# Patient Record
Sex: FEMALE | Race: WHITE | NOT HISPANIC OR LATINO | Employment: UNEMPLOYED | URBAN - METROPOLITAN AREA
[De-identification: names, ages, dates, MRNs, and addresses within clinical notes are randomized per-mention and may not be internally consistent; named-entity substitution may affect disease eponyms.]

---

## 2018-05-15 ENCOUNTER — TELEPHONE CONVERTED (OUTPATIENT)
Dept: ONCOLOGY | Facility: HOSPITAL | Age: 43
End: 2018-05-15
Attending: NURSE PRACTITIONER

## 2018-09-26 ENCOUNTER — CONVERSION ENCOUNTER (OUTPATIENT)
Dept: GENERAL RADIOLOGY | Facility: HOSPITAL | Age: 43
End: 2018-09-26

## 2018-10-23 ENCOUNTER — CONVERSION ENCOUNTER (OUTPATIENT)
Dept: GENERAL RADIOLOGY | Facility: HOSPITAL | Age: 43
End: 2018-10-23

## 2018-11-06 ENCOUNTER — CONVERSION ENCOUNTER (OUTPATIENT)
Dept: ULTRASOUND IMAGING | Facility: HOSPITAL | Age: 43
End: 2018-11-06

## 2019-10-18 ENCOUNTER — HOSPITAL ENCOUNTER (OUTPATIENT)
Dept: ONCOLOGY | Facility: HOSPITAL | Age: 44
Discharge: HOME | End: 2019-10-18
Attending: INTERNAL MEDICINE

## 2019-10-18 ENCOUNTER — OFFICE VISIT CONVERTED (OUTPATIENT)
Dept: ONCOLOGY | Facility: HOSPITAL | Age: 44
End: 2019-10-18
Attending: INTERNAL MEDICINE

## 2019-10-18 LAB
ALBUMIN SERPL-MCNC: 4.2 G/DL (ref 3.5–5)
ALBUMIN/GLOB SERPL: 1.3 {RATIO} (ref 1.4–2.6)
ALP SERPL-CCNC: 70 U/L (ref 42–98)
ALT SERPL-CCNC: 11 U/L (ref 10–40)
ANION GAP SERPL CALC-SCNC: 15 MMOL/L (ref 8–19)
AST SERPL-CCNC: 15 U/L (ref 15–50)
BASOPHILS # BLD AUTO: 0.05 10*3/UL (ref 0–0.2)
BASOPHILS NFR BLD AUTO: 0.8 % (ref 0–3)
BILIRUB SERPL-MCNC: 0.21 MG/DL (ref 0.2–1.3)
BUN SERPL-MCNC: 16 MG/DL (ref 5–25)
BUN/CREAT SERPL: 33 {RATIO} (ref 6–20)
CALCIUM SERPL-MCNC: 9.1 MG/DL (ref 8.7–10.4)
CHLORIDE SERPL-SCNC: 101 MMOL/L (ref 99–111)
CONV ABS IMM GRAN: 0.02 10*3/UL (ref 0–0.2)
CONV CO2: 26 MMOL/L (ref 22–32)
CONV IMMATURE GRAN: 0.3 % (ref 0–1.8)
CONV TOTAL PROTEIN: 7.4 G/DL (ref 6.3–8.2)
CREAT UR-MCNC: 0.49 MG/DL (ref 0.5–0.9)
DEPRECATED RDW RBC AUTO: 40.4 FL (ref 36.4–46.3)
EOSINOPHIL # BLD AUTO: 0.26 10*3/UL (ref 0–0.7)
EOSINOPHIL # BLD AUTO: 4.1 % (ref 0–7)
ERYTHROCYTE [DISTWIDTH] IN BLOOD BY AUTOMATED COUNT: 13.5 % (ref 11.7–14.4)
FERRITIN SERPL-MCNC: 11 NG/ML (ref 10–200)
GFR SERPLBLD BASED ON 1.73 SQ M-ARVRAT: >60 ML/MIN/{1.73_M2}
GLOBULIN UR ELPH-MCNC: 3.2 G/DL (ref 2–3.5)
GLUCOSE SERPL-MCNC: 89 MG/DL (ref 65–99)
HCT VFR BLD AUTO: 35.4 % (ref 37–47)
HGB BLD-MCNC: 11 G/DL (ref 12–16)
IRON SATN MFR SERPL: 10 % (ref 20–55)
IRON SERPL-MCNC: 44 UG/DL (ref 60–170)
LYMPHOCYTES # BLD AUTO: 1.83 10*3/UL (ref 1–5)
LYMPHOCYTES NFR BLD AUTO: 29.2 % (ref 20–45)
MCH RBC QN AUTO: 25.3 PG (ref 27–31)
MCHC RBC AUTO-ENTMCNC: 31.1 G/DL (ref 33–37)
MCV RBC AUTO: 81.6 FL (ref 81–99)
MONOCYTES # BLD AUTO: 0.58 10*3/UL (ref 0.2–1.2)
MONOCYTES NFR BLD AUTO: 9.3 % (ref 3–10)
NEUTROPHILS # BLD AUTO: 3.53 10*3/UL (ref 2–8)
NEUTROPHILS NFR BLD AUTO: 56.3 % (ref 30–85)
NRBC CBCN: 0 % (ref 0–0.7)
OSMOLALITY SERPL CALC.SUM OF ELEC: 287 MOSM/KG (ref 273–304)
PLATELET # BLD AUTO: 254 10*3/UL (ref 130–400)
PMV BLD AUTO: 10.7 FL (ref 9.4–12.3)
POTASSIUM SERPL-SCNC: 4 MMOL/L (ref 3.5–5.3)
RBC # BLD AUTO: 4.34 10*6/UL (ref 4.2–5.4)
SODIUM SERPL-SCNC: 138 MMOL/L (ref 135–147)
TIBC SERPL-MCNC: 435 UG/DL (ref 245–450)
TRANSFERRIN SERPL-MCNC: 304 MG/DL (ref 250–380)
WBC # BLD AUTO: 6.27 10*3/UL (ref 4.8–10.8)

## 2019-11-01 ENCOUNTER — HOSPITAL ENCOUNTER (OUTPATIENT)
Dept: OTHER | Facility: HOSPITAL | Age: 44
Setting detail: RECURRING SERIES
Discharge: HOME | End: 2019-11-30
Attending: NURSE PRACTITIONER

## 2021-05-28 VITALS
OXYGEN SATURATION: 100 % | HEART RATE: 67 BPM | DIASTOLIC BLOOD PRESSURE: 62 MMHG | SYSTOLIC BLOOD PRESSURE: 104 MMHG | TEMPERATURE: 97.6 F | BODY MASS INDEX: 29.83 KG/M2 | HEIGHT: 65 IN | WEIGHT: 179.01 LBS

## 2021-05-28 VITALS
HEIGHT: 64 IN | BODY MASS INDEX: 28.87 KG/M2 | OXYGEN SATURATION: 100 % | TEMPERATURE: 98.6 F | HEART RATE: 67 BPM | SYSTOLIC BLOOD PRESSURE: 103 MMHG | WEIGHT: 169.09 LBS | DIASTOLIC BLOOD PRESSURE: 66 MMHG

## 2021-05-28 NOTE — PROGRESS NOTES
Patient: MISA LEON     Acct: [id number]     Report: #[id number]  UNIT #: [id number]     : 1975    Encounter Date:10/18/2019  PRIMARY CARE:   ***Signed***  --------------------------------------------------------------------------------------------------------------------  NURSE INTAKE      Visit Type      New Patient Visit            Chief Complaint      ANEMIA            Referring Provider/Copies To      Referring Provider:  PIERCE GUADARRAMA      Primary Care Provider:  PIERCE GUADARRAMA            History and Present Illness      Past History      Patient initially see by Dr. Ishan Lake for iron and B12 deficiency anemia     s/p gastric bypass state.  History from consult is as follows:             This is a 44-year-old female who returns to this clinic for additional treatment    of anemia.  Last visit was in 2015 with Dr. Chun.  Since that time     patient reports feeling well; however, approximately 2 and 1/2 months ago began     to have fatigue, leg cramps, occasional dizziness, and SOA with activity.  She     continues to have menstrual cycles that last 7 days and are heavy in flow.      Patient also had gastric bypass approximately 18 years ago.  No longer taking     Vitamin B12 injections.              -2018.  WBC 4.7, hemoglobin 9.8, platelets 285.  Total iron 34, TIBC     453, Iron saturation 8, Ferritin 5, Vitamin B12 241, Vitamin D 9.  PCP     prescribed Vitamin D 50,000U wkly.      -May 15, 2018. Iron 60, TIBC 512, Iron sat 12%, Ferritin 10. FSH 19.9            Patient received 2 doses of ferraheme in 2018  Last labs are from 2018.            Currently Misa feels extremely fatigued, has pica for the smell of open     markers and heavy detergents.  She has not started to have pica for eating ice     as such.  She denies palpitations, shortness of breath, dizziness, loss of blood    in her stools.            PAST, FAMILY   Past Medical History       Hematology/Oncology (F):  Anemia            Family History      Family History:  Anemia (MOTHER)            Social History      Lives independently:  No            Tobacco Use      Tobacco status:  Former smoker      Quit status:  Quit date established (3 YEARS IN NOVEMBER)            Alcohol Use      Alcohol intake:  None            Substance Use      Substance use:  Denies use            REVIEW OF SYSTEMS      General:  Admits: Fatigue;          Denies: Appetite Change, Fever, Night Sweats, Weight Gain, Weight Loss      Eye:  Denies Blurred Vision, Denies Corrective Lenses, Denies Diplopia, Denies     Vision Changes      ENT:  Denies Headache, Denies Hearing Loss, Denies Hoarseness, Denies Sore Thro    at      Cardiovascular:  Denies Chest Pain, Denies Palpitations      Respiratory:  Denies: Coughing Blood, Productive Cough, Shortness of Air,     Wheezing      Gastrointestinal:  Denies Bloody Stools, Denies Constipation, Denies Diarrhea,     Denies Nausea/Vomiting, Denies Problem Swallowing, Denies Unable to Control     Bowels      Genitourinary:  Denies Blood in Urine, Denies Incontinence, Denies Painful     Urination      Musculoskeletal:  Denies Back Pain, Denies Muscle Pain, Denies Painful Joints      Integumentary:  Denies Itching, Denies Lesions, Denies Rash      Neurologic:  Denies Dizziness, Denies Numbness\Tingling, Denies Seizures      Psychiatric:  Denies Anxiety, Denies Depression      Endocrine:  Denies Cold Intolerance, Denies Heat Intolerance      Hematologic/Lymphatic:  Denies Bruising, Denies Bleeding, Denies Enlarged Lymph     Nodes      Reproductive:  Denies: Menopause, Heavy Periods, Pregnant, Still Menstruating            VITAL SIGNS AND SCORES      Vitals      Height 5 ft 4.33 in / 163.4 cm      Weight 169 lbs 1.485 oz / 76.7 kg      BSA 1.83 m2      BMI 28.7 kg/m2      Temperature 98.6 F / 37 C - Temporal      Pulse 67      Blood Pressure 103/66 Sitting, Right Arm      Pulse Oximetry 100%,  ROOM AIR            Pain Score      Experiencing any pain?:  No      Pain Scale Used:  Numerical      Pain Intensity:  0            Fatigue Score      Experiencing any fatigue?:  Yes      Fatigue (0-10 scale):  6            EXAM      Other      General appearance:  in no apparent distress, cooperative, appears stated age.      HEENT: + pallor, no icterus, oral mucosa moist      Neck: Supple, trachea central-not deviated      Lymph nodes: none palpable peripherally      Cardiovascular: S1-S2 heard, no murmurs, no rubs, no gallops.      Respiratory: Clear to auscultation bilaterally, no adventitious sounds      Abdomen/gastro: Soft, nontender, no palpable hepatosplenomegaly, bowel sounds     heard      Skin: No lesions, no rashes, no petechiae.      Extremities: No pedal edema, peripheral pulses felt, no clubbing            PREVENTION      Hx Influenza Vaccination:  No      Date Influenza Vaccine Given:  Oct 1, 2017      Influenza Vaccine Declined:  No      2 or More Falls Past Year?:  No      Fall Past Year with Injury?:  No      Hx Pneumococcal Vaccination:  No      Encouraged to follow-up with:  PCP regarding preventative exams.      Chart initiated by      JAMAL VEGA [facility]            ALLERGY/MEDS      Allergies      Coded Allergies:             PENICILLINS (Verified  Allergy, Unknown, VOMITING, 10/18/19)           CODEINE (Verified  Adverse Reaction, Unknown, CONFUSION, NAUSEA, 10/18/19)           MORPHINE (Verified  Adverse Reaction, Unknown, CONFUSION, NAUSEA, 10/18/19)      Uncoded Allergies:             Z-PACK (Adverse Reaction, Unknown, DIZZINESS, 5/15/18)            Medications      Last Reconciled on 5/17/18 18:46 by CHAD PIMENTEL MD      Multivitamin (Multivitamins) 1 Each Capsule      1 EACH PO QDAY, CAP         Reported         10/18/19       Escitalopram Oxalate (Escitalopram Oxalate*) 20 Mg Tablet      20 MG PO QDAY, TAB         Reported         10/18/19       Escitalopram Oxalate (Escitalopram  Oxalate*) 20 Mg Tablet      20 MG PO QDAY, TAB         Reported         5/15/18      Medications Reviewed:  Changes made to meds            IMPRESSION/PLAN      Diagnosis      Iron deficiency anemia         Other iron deficiency anemia - D50.8         Iron deficiency anemia type: other iron deficiency      Iron deficiency anemia secondary to status post gastric bypass state.  Last time    patient was treated with Feraheme was in 5/2018.      Obtain iron profile, CBC with differential, CMP, ferritin and we will call the     patient in the next few days to go over the results and schedule a Feraheme.      Patient will need to be compliant with coming back to the clinic every 6 months     with laboratory investigations drawn and ready for review prior to next     appointment.            Pernicious anemia - D51.0      As long as the patient has B12 supplies she is compliant with self injections-    she needs vitamin B12 1000 mcg subcutaneously once every month.  Prescription     given for the same.      New Medications      * Cyanocobalamin (Vitamin B-12) Inj (Cyanocobalamin Inj) 1,000 MCG/1 ML VIAL:       1,000 MCG SUBQ MO #3      New Diagnostics      * CBC, Routine      * CMP Comp Metabolic Panel, Routine      * Iron Profile, Routine      * Ferritin Level, Routine      * CBC, 6 Months      * CMP Comp Metabolic Panel, 6 Months      * Iron Profile, 6 Months      * Ferritin Level, 6 Months            Notes      Patient is to return with labs in 6 months and we will call her in the interim     to arrange for Feraheme.      New Medications      * ESCITALOPRAM OXALATE (Escitalopram Oxalate*) 20 MG TABLET: 20 MG PO QDAY      * Multivitamin (Multivitamins) 1 EACH CAPSULE: 1 EACH PO QDAY      Discontinued Medications      * Cyanocobalamin (Vitamin B-12) Inj (Cyanocobalamin Inj) 1,000 MCG/1 ML VIAL:       1,000 MCG SUBQ Mo 30 Days #12         Instructions: 1000 mcg or 1 ml, SubQ daily x 5 days, then weekly x 4 weeks,         then monthly x 3 months      * Syringe Insulin w Needle 1 ml (Insulin Syringe with Needle, 1 ml) 1 EACH       DISP.SYRIN: SYRINGE XX QDAY #12         Instructions: subcutaneous needle.                 Disclaimer: Converted document may not contain table formatting or lab diagrams. Please see [employer] [facility] for the authenticated document.

## 2021-05-28 NOTE — TELEPHONE ENCOUNTER
Patient: MISA LEON     Acct: [id number]     Report: #[id number]  UNIT #: [id number]     : 1975    Encounter Date:05/15/2018  PRIMARY CARE:   ***Signed***  --------------------------------------------------------------------------------------------------------------------  MD      Provider:  Dr. Lake            Allergies      Coded Allergies:             PENICILLINS (Verified  Allergy, Unknown, VOMITING, 5/15/18)           CODEINE (Verified  Adverse Reaction, Unknown, CONFUSION, NAUSEA, 5/15/18)           MORPHINE (Verified  Adverse Reaction, Unknown, CONFUSION, NAUSEA, 5/15/18)      Uncoded Allergies:             Z-PACK (Adverse Reaction, Unknown, DIZZINESS, 5/15/18)            Medications      Last Reconciled on 5/15/18 17:57 by WILLIAM PRICE      Syringe Insulin w Needle 1 ml (Insulin Syringe with Needle, 1 ml) 1 Each     Disp.syrin      SYRINGE XX QDAY, #12 0 Refills         Prov: WILLIAM PRICE         5/15/18       Cyanocobalamin (Vitamin B-12*) 1,000 Mcg/1 Ml Vial      1000 MCG SUBQ Mo for 30 Days, #12 VIAL 0 Refills         Prov: WILLIAM PRICE         5/15/18       Escitalopram Oxalate (Escitalopram Oxalate*) 20 Mg Tablet      20 MG PO QDAY, TAB         Reported         5/15/18      Current Medications      Current Medications Reviewed 18            Time of Call      12:23      Source of Call:  Patient            Reason for Call            Pt called to report that syringes and needles for her vit B12 were not      called into [facility]. Called in to [facility] per APRN's order. Pt notified. No      further questions. JS            Guideline(s) Referenced      Yes            Verbalized Understanding      Caller verbalized, understanding of instructions given            Electronically signed by WILLIAM PRICE  2020 14:02  Electronically signed by ADMINISTRATION,EMILY  2020 14:17       Disclaimer: Converted document may not contain table formatting  or lab diagrams. Please see [employer] [facility] for the authenticated document.

## 2021-05-28 NOTE — PROGRESS NOTES
Patient: MISA LEON     Acct: [id number]     Report: #[id number]  UNIT #: [id number]     : 1975    Encounter Date:05/15/2018  PRIMARY CARE:   ***Signed***  --------------------------------------------------------------------------------------------------------------------  Chief Complaint      May 15, 2018      ANEMIA            Current Plan      -Anemia      -History of gastric bypass surgery .       -Vitamin B12 1000mcg SQ daily x5 then weekly x4 then monthly x3      -CBC, iron profile, ferritin, LDH   -FSH to determine if patient has begun menopause. Patient is not in menopause.     FSH is 18.9.       -Urinalysis to check for infection and bleeding, UA was normal.       -Will schedule for iron infusions once labs return      -Fereheme ordered x 2.       -RTC in 3 months. Recheck iron panel, ferritin, B-12, folate in 3 months.              -Vitamin B12 deficiency      -Continue vitamin B12 injections.      -Recheck levels today            -Mood disorder      -Continue Lexapro      -Stable            -Today's Evaluation      -Patient's imaging exams, blood tests, physicians' notes, and any new findings     since our last visit were reviewed today to reassess patient's medical     treatment plan.      -Old medical records were reviewed and summarized in chronological order in the     HPI today to maintain an updated medical record.       -Patient's radiology imaging tests from our last visit were reviewed     independently by me by direct visualization of the images.        -Patients current lab tests and medications were carefully reviewed to evaluate     patient's current treatment plan today.       -Patient was advised to call us right away if there are any new symptoms for an     urgent visit for further evaluation. Patient voiced understanding and agreed to     do so.      Pernicious anemia - D51.0            Notes      New Medications      * ESCITALOPRAM OXALATE (Escitalopram  Oxalate*) 20 MG TABLET: 20 MG PO QDAY      * Cyanocobalamin (Vitamin B-12*) 1,000 MCG/1 ML VIAL: 1,000 MCG SUBQ Mo 30 Days     #12       Instructions: 1000 mcg or 1 ml, SubQ daily x 5 days, then weekly x 4 weeks,     then monthly x 3 months      * Syringe Insulin w Needle 1 ml (Insulin Syringe with Needle, 1 ml) 1 EACH     DISP.SYRIN: SYRINGE XX QDAY #12       Instructions: subcutaneous needle.       New Diagnostics      * Haptoglobin, Stat       Dx: Pernicious anemia - D51.0      * LDH       Dx: Pernicious anemia - D51.0      * CBC       Dx: Pernicious anemia - D51.0      * Fsh/Folicle Stimulat, Stat       Dx: Pernicious anemia - D51.0      * Urinalyis W/Micro, Stat       Dx: Pernicious anemia - D51.0      * Iron Profile       Dx: Pernicious anemia - D51.0      * Ferritin Level, As Soon As Possible       Dx: Pernicious anemia - D51.0      Intensive Monitor for Toxicity:  Labs Reviewed, Meds\Narcotics Reviewed      Labs Reviewed During Visit?:  Yes      Time Spent:  > 50% /Coord Care      Patient Education Provided:  Yes      ECOG Score:  0            ECOG      ECOG Performance Status:  0            History and Present Illness      This is a 43-year-old female who returns to this clinic for additional     treatment of anemia.  Last visit was in July 2015 with Dr. Chun.  Since that     time patient reports feeling well; however, approximately 2 and 1/2 months ago     began to have fatigue, leg cramps, occasional dizziness, and SOA with activity.      She continues to have menstrual cycles that last 7 days and are heavy in     flow.  Patient also had gastric bypass approximately 18 years ago.  No longer     taking Vitamin B12 injections.              -March 20, 2018.  WBC 4.7, hemoglobin 9.8, platelets 285.  Total iron 34, TIBC     453, Iron saturation 8, Ferritin 5, Vitamin B12 241, Vitamin D 9.  PCP     prescribed Vitamin D 50,000U wkly.      -May 15, 2018. Iron 60, TIBC 512, Iron sat 12%, Ferritin 10. FSH  19.9            Vitals      Height 5 ft 4.76 in / 164.5 cm      Weight 179 lbs 0.216 oz / 81.2 kg      BSA 1.95 m2      BMI 30.0 kg/m2      Temperature 97.6 F / 36.44 C - Temporal      Pulse 67      Blood Pressure 104/62 Sitting, Right Arm      Pulse Oximetry 100%, ROOM AIR            Allergies      Coded Allergies:             PENICILLINS (Verified  Allergy, Unknown, VOMITING, 5/15/18)           CODEINE (Verified  Adverse Reaction, Unknown, CONFUSION, NAUSEA, 5/15/18)           MORPHINE (Verified  Adverse Reaction, Unknown, CONFUSION, NAUSEA, 5/15/18)      Uncoded Allergies:             Z-PACK (Adverse Reaction, DIZZINESS, 5/15/18)            Medications      Last Reconciled on 5/17/18 18:46 by CHAD PIMENTEL MD      Syringe Insulin w Needle 1 ml (Insulin Syringe with Needle, 1 ml) 1 Each     Disp.syrin      SYRINGE XX QDAY, #12 0 Refills         Prov: WILLIAM PRICE onc         5/15/18       Cyanocobalamin (Vitamin B-12*) 1,000 Mcg/1 Ml Vial      1000 MCG SUBQ Mo for 30 Days, #12 VIAL 0 Refills         Prov: WILLIAM PRICE onc         5/15/18       Escitalopram Oxalate (Escitalopram Oxalate*) 20 Mg Tablet      20 MG PO QDAY, TAB         Reported         5/15/18            General Information      Referring Provider:  PIERCE GUADARRAMA      Primary Provider:  PIERCE GUADARRAMA            Pain and Fatigue Scales      Pain Assessment:           Experiencing any pain?:  No      Fatigue:           Experiencing any fatigue?:  Yes         Fatigue (0-10 scale):  3            Chemo Status      On Oral Chemotherapy?:  No            Other      Clinical Trial Participant?:  No            PAST, FAMILY   Past Medical History      Past Medical History:  No Diabetes Type 1, No Diabetes Type 2, No Thyroid     Disease, No COPD, No Emphysema, No Hypertension, No Stroke, No High Cholesterol    , No Heart Attack, No Bleeding Condition, No Low or High RBC Count, No Low or     High WBC Count, No Low or High Platelet Coun, No Hepatitis, No  Kidney Disease,     No Depression, No Alzheimer's Disease, No Mental Disease, No Seizures, No     Arthritis, No Osteoporosis, No Osteopenia, No Short of Air, No Sleep apnea, No     Liver Disease, No STD, No Enlarged Prostate, No Other      Hematology/oncology:  REPORTS HX OF: Anemia, DENIES HX OF: Previous Treatment     for CA, Bladder Cancer, Blood cancer, Brain cancer, Breast cancer, Cervical     cancer, Coagulopathy, Colorectal cancer, Endocrine cancer, Eye cancer, GI cancer    ,  cancer, Kidney cancer, Leukemia, Leukocytosis, Leukopenia, Liver cancer,     Lung cancer, Lymphoma, Musculoskeletal cancer, Myeloma, Neurologic cancer, Oral     cancer, Ovarian cancer, Skin cancer, Stomach cancer, Thrombocytopenia, Thyroid     cancer, Uterine cancer, Other cancer history, Other hematologic history      Genetic/metabolic:  DENIES HX OF: Cystic fibrosis, Down syndrome, Other genetic     history, Other metabolic history            Past Surgical History      REPORTS HX OF: Cholecystectomy, Other Past Surgical Hx (GASTRIC BYPASS), DENIES     HX OF: Cataract extraction, Thyroid surgery, Lung biopsy, CABG surgery,     Coronary stent, Valve replacement, Appendectomy, Splenectomy, Bladder surgery,     Nephrectomy, Joint replacement, Frature repair, Skin cancer removal, Melanoma     excision, Spinal surgery, Breast biopsy, Lumpectomy, Mastectomy, bilateral,     Mastectomy, right, Mastectomy, left, Hysterectomy, Peg Tube Placement, VAD     Placement, Biopsy            Family History      REPORTS HX OF: Liver Cancer (FATHER), DENIES HX OF: Anemia, Blood disorders,     Blood Cancer, Breast cancer, Cervical cancer, Coagulopathy, Colorectal cancer,     Endocrine Cancer, Eye Cancer, GI Cancer,  Cancer, Kidney Cancer, Leukemia,     Leukocytosis, Leukopenia, Lung cancer, Lymphoma, Melanoma, Musculoskeletal     Cancer, Myeloma, Neurologic Cancer, Oral Cancer, Ovarian cancer, Prostate cancer    , Skin Cancer, Stomach Cancer, Testicular  Cancer, Thrombocytopenia, Thyroid     cancer, Uterine cancer, Other Cancer History, Other Hematology History            Social History      Lives independently:  No            Tobacco Use      Tobacco status:  Former smoker      Quit status:  Quit date established (3 YEARS IN NOVEMBER)            Alcohol Use      Alcohol intake:  None            Substance Use      Substance use:  Denies use            REVIEW OF SYSTEMS      General:  Complains of: Fatigue, Denies: Appetite change, Excessive sweating,     Fever, Night sweats, Weight gain, Weight loss, Other      Eyes:  Denies: Blurred vision, Corrective lenses, Diplopia, Eye irritation, Eye     pain, Eye redness, Spots in vision, Vision loss, Other      Ears, nose, mouth, throat:  Denies: Headache, Seizures, Visual Changes, Hearing     loss, Sinus Congestion, Hoarseness, Sore throat, Other      Cardiovascular:  Denies: Chest pain, Irregular heartbeat, Palpitations, Swollen     ankles/legs, Other      Respiratory:  Denies: Chest pain, Shortness of Air, Productive cough, Coughing     blood, Other      Gastrointestinal:  Denies: Nausea, Vomiting, Problem swallowing, Frequent     heartburn, Constipation, Diarrhea, Tarry stools, Bloody stools, Unable to     control bowels, Other      Kidney/Bladder:  Denies: Painful Urination, Change in urinary stream, Blood in     urine, Incontinence, Frequent Urination, Decreased urine stream, Other      Musculoskeletal:  Denies: New Back pain, Leg Cramps, Painful Joints, Swollen     Joints, Muscle Pain, Muscle weakness, Other      Skin:  DENIES: Jaundice, Easy Bleeding, Lesions/changes in moles, Nail changes,     Skin Discoloration, Rash, Other      Neurological:  Denies: Dizziness, Fainting, Numbness\Tingling, Paralysis,     Seizures, Other      Psychiatric:  Complains of: AAO X 3, Denies: Anxiety, Panic attacks, Depression    , Memory loss, Other      Endocrine:  DiabetesThyroid DisorderOsteoporosisEndocrine Other       Hematologic/lymphatic:  Denies: Bruising, Bleeding, Enlarged Lymph Nodes,     Recurrent infections, Other      Reproductive:  Complains of Still Menstruating, Complains of Heavy Periods,     Denies Pregnant, Denies Menopause, Denies Other            General Appearance:  Alert, Oriented X3, Cooperative, No acute distress      Eyes:  Anicteric Sclerae, Moist Conjunctiva      HEENT:  Orophraynx clear, No Erythema, No Pallor, No Thrush      Neck:  Supple, Full ROM      Respiratory:  CTAB, No Rales, No Crackels, No Stridor, No Rhonchi      Breast\Chest:  Symmetrical      Abdomen\Gastro:  Soft      Cardio:  RRR, No Murmur, No, Peripheral Edema      Skin:  Normal Temperature, Normal Tone, Normal Texture and Turgor, No Rash,     lesions, ulcers      Psychiatric:  Appropriate Affect, Intact Judgement, AAO x3      Neuro:  Cranial Nerve II-XII Inta, No Focal Sensory Deficit      Genitourinary:  No Saba Catheter      Muscularskeletal:  Normal Gait and Station, Full ROM of extremeties, Full     muscle strength\tone      Extremities:  No Digital Cyanosis, Normal Gait and station, No Weakness      Lymphatic:  No Cervical, No Supraclavicular, No Infraclavicular, No Axillary            PREVENTION      Hx Influenza Vaccination:  Yes      Date Influenza Vaccine Given:  Oct 1, 2017      Influenza Vaccine Declined:  No      2 or More Falls Past Year?:  No      Fall Past Year with Injury?:  No      Hx Pneumococcal Vaccination:  Yes      Encouraged to follow-up with:  PCP regarding preventative exams.      Chart initiated by      JAMAL VEGA CMA            Patient was seen in clinic, physical exam was performed, lab and imaging tests     were noted, and above medical documentation was also done by me.  Thank you     very much for the opportunity to participate in your patient's care.  Warm     Regards.             Ishan Lake MD FACP      Board Certified Hematologist      Board Certified Medical Oncologist             Electronically signed by WILLIAM PRICE  05/15/2018 17:57  Signed by SUSI ESQUIVEL  02/26/2020 09:06  Electronically signed by CAHD PIMENTEL  05/17/2018 18:46       Disclaimer: Converted document may not contain table formatting or lab diagrams. Please see [employer] for the authenticated document.

## 2022-10-11 ENCOUNTER — OFFICE VISIT (OUTPATIENT)
Dept: INTERNAL MEDICINE | Facility: CLINIC | Age: 47
End: 2022-10-11

## 2022-10-11 VITALS
HEIGHT: 64 IN | TEMPERATURE: 98.2 F | WEIGHT: 151.38 LBS | HEART RATE: 73 BPM | SYSTOLIC BLOOD PRESSURE: 122 MMHG | OXYGEN SATURATION: 99 % | BODY MASS INDEX: 25.84 KG/M2 | DIASTOLIC BLOOD PRESSURE: 76 MMHG

## 2022-10-11 DIAGNOSIS — E61.1 IRON DEFICIENCY: ICD-10-CM

## 2022-10-11 DIAGNOSIS — Z12.31 VISIT FOR SCREENING MAMMOGRAM: ICD-10-CM

## 2022-10-11 DIAGNOSIS — H93.8X1 SENSATION OF FULLNESS IN RIGHT EAR: ICD-10-CM

## 2022-10-11 DIAGNOSIS — R10.9 FLANK PAIN: ICD-10-CM

## 2022-10-11 DIAGNOSIS — Z87.891 STOPPED SMOKING WITH GREATER THAN 30 PACK YEAR HISTORY: ICD-10-CM

## 2022-10-11 DIAGNOSIS — R00.2 PALPITATIONS: ICD-10-CM

## 2022-10-11 DIAGNOSIS — Z12.11 SCREENING FOR COLON CANCER: ICD-10-CM

## 2022-10-11 DIAGNOSIS — Z72.89 CURRENT EVERY DAY VAPING: ICD-10-CM

## 2022-10-11 DIAGNOSIS — F41.9 ANXIETY: ICD-10-CM

## 2022-10-11 DIAGNOSIS — F33.0 MAJOR DEPRESSIVE DISORDER, RECURRENT, MILD: ICD-10-CM

## 2022-10-11 DIAGNOSIS — Z76.89 ENCOUNTER TO ESTABLISH CARE: Primary | ICD-10-CM

## 2022-10-11 LAB
ALBUMIN SERPL-MCNC: 4.3 G/DL (ref 3.5–5.2)
ALBUMIN/GLOB SERPL: 1.5 G/DL
ALP SERPL-CCNC: 94 U/L (ref 39–117)
ALT SERPL W P-5'-P-CCNC: 15 U/L (ref 1–33)
ANION GAP SERPL CALCULATED.3IONS-SCNC: 8.9 MMOL/L (ref 5–15)
AST SERPL-CCNC: 22 U/L (ref 1–32)
BASOPHILS # BLD AUTO: 0.02 10*3/MM3 (ref 0–0.2)
BASOPHILS NFR BLD AUTO: 0.5 % (ref 0–1.5)
BILIRUB SERPL-MCNC: 0.5 MG/DL (ref 0–1.2)
BUN SERPL-MCNC: 13 MG/DL (ref 6–20)
BUN/CREAT SERPL: 22.4 (ref 7–25)
CALCIUM SPEC-SCNC: 9.6 MG/DL (ref 8.6–10.5)
CHLORIDE SERPL-SCNC: 100 MMOL/L (ref 98–107)
CHOLEST SERPL-MCNC: 166 MG/DL (ref 0–200)
CO2 SERPL-SCNC: 29.1 MMOL/L (ref 22–29)
CREAT SERPL-MCNC: 0.58 MG/DL (ref 0.57–1)
DEPRECATED RDW RBC AUTO: 34.4 FL (ref 37–54)
EGFRCR SERPLBLD CKD-EPI 2021: 111.1 ML/MIN/1.73
EOSINOPHIL # BLD AUTO: 0.17 10*3/MM3 (ref 0–0.4)
EOSINOPHIL NFR BLD AUTO: 4.1 % (ref 0.3–6.2)
ERYTHROCYTE [DISTWIDTH] IN BLOOD BY AUTOMATED COUNT: 12.3 % (ref 12.3–15.4)
FERRITIN SERPL-MCNC: 48.3 NG/ML (ref 13–150)
GLOBULIN UR ELPH-MCNC: 2.8 GM/DL
GLUCOSE SERPL-MCNC: 88 MG/DL (ref 65–99)
HCT VFR BLD AUTO: 38.9 % (ref 34–46.6)
HDLC SERPL-MCNC: 67 MG/DL (ref 40–60)
HGB BLD-MCNC: 12.7 G/DL (ref 12–15.9)
IMM GRANULOCYTES # BLD AUTO: 0 10*3/MM3 (ref 0–0.05)
IMM GRANULOCYTES NFR BLD AUTO: 0 % (ref 0–0.5)
IRON 24H UR-MRATE: 109 MCG/DL (ref 37–145)
IRON SATN MFR SERPL: 24 % (ref 20–50)
LDLC SERPL CALC-MCNC: 86 MG/DL (ref 0–100)
LDLC/HDLC SERPL: 1.28 {RATIO}
LYMPHOCYTES # BLD AUTO: 1.52 10*3/MM3 (ref 0.7–3.1)
LYMPHOCYTES NFR BLD AUTO: 36.5 % (ref 19.6–45.3)
MCH RBC QN AUTO: 25.8 PG (ref 26.6–33)
MCHC RBC AUTO-ENTMCNC: 32.6 G/DL (ref 31.5–35.7)
MCV RBC AUTO: 79.1 FL (ref 79–97)
MONOCYTES # BLD AUTO: 0.43 10*3/MM3 (ref 0.1–0.9)
MONOCYTES NFR BLD AUTO: 10.3 % (ref 5–12)
NEUTROPHILS NFR BLD AUTO: 2.02 10*3/MM3 (ref 1.7–7)
NEUTROPHILS NFR BLD AUTO: 48.6 % (ref 42.7–76)
NRBC BLD AUTO-RTO: 0 /100 WBC (ref 0–0.2)
PLATELET # BLD AUTO: 258 10*3/MM3 (ref 140–450)
PMV BLD AUTO: 11.2 FL (ref 6–12)
POTASSIUM SERPL-SCNC: 4.1 MMOL/L (ref 3.5–5.2)
PROT SERPL-MCNC: 7.1 G/DL (ref 6–8.5)
RBC # BLD AUTO: 4.92 10*6/MM3 (ref 3.77–5.28)
SODIUM SERPL-SCNC: 138 MMOL/L (ref 136–145)
T4 FREE SERPL-MCNC: 1.14 NG/DL (ref 0.93–1.7)
TIBC SERPL-MCNC: 446 MCG/DL (ref 298–536)
TRANSFERRIN SERPL-MCNC: 299 MG/DL (ref 200–360)
TRIGL SERPL-MCNC: 66 MG/DL (ref 0–150)
TSH SERPL DL<=0.05 MIU/L-ACNC: 0.43 UIU/ML (ref 0.27–4.2)
VLDLC SERPL-MCNC: 13 MG/DL (ref 5–40)
WBC NRBC COR # BLD: 4.16 10*3/MM3 (ref 3.4–10.8)

## 2022-10-11 PROCEDURE — 99205 OFFICE O/P NEW HI 60 MIN: CPT | Performed by: NURSE PRACTITIONER

## 2022-10-11 PROCEDURE — 83540 ASSAY OF IRON: CPT | Performed by: NURSE PRACTITIONER

## 2022-10-11 PROCEDURE — 80050 GENERAL HEALTH PANEL: CPT | Performed by: NURSE PRACTITIONER

## 2022-10-11 PROCEDURE — 84439 ASSAY OF FREE THYROXINE: CPT | Performed by: NURSE PRACTITIONER

## 2022-10-11 PROCEDURE — 81003 URINALYSIS AUTO W/O SCOPE: CPT | Performed by: NURSE PRACTITIONER

## 2022-10-11 PROCEDURE — 80061 LIPID PANEL: CPT | Performed by: NURSE PRACTITIONER

## 2022-10-11 PROCEDURE — 84466 ASSAY OF TRANSFERRIN: CPT | Performed by: NURSE PRACTITIONER

## 2022-10-11 PROCEDURE — 82728 ASSAY OF FERRITIN: CPT | Performed by: NURSE PRACTITIONER

## 2022-10-11 RX ORDER — FLUTICASONE PROPIONATE 50 MCG
2 SPRAY, SUSPENSION (ML) NASAL DAILY
Qty: 16 G | Refills: 0 | Status: SHIPPED | OUTPATIENT
Start: 2022-10-11

## 2022-10-11 RX ORDER — BUPROPION HYDROCHLORIDE 150 MG/1
TABLET, EXTENDED RELEASE ORAL
Qty: 60 TABLET | Refills: 1 | Status: SHIPPED | OUTPATIENT
Start: 2022-10-11

## 2022-10-11 NOTE — ASSESSMENT & PLAN NOTE
Depression well-controlled, continue Lexapro.  Patient continues with flares of anxiety, does not tolerate BuSpar.  Discussed Wellbutrin, patient would like to proceed with medication.  Denies history of seizures.  Discussed potential for lightheadedness, headache, abnormal dreams.  She is aware of increased risk of suicidal thoughts with this medication and will discontinue immediately with concerns.  Follow-up in 1 month to evaluate medication effectiveness, sooner if concerns arise patient denies SI/HI.

## 2022-10-11 NOTE — PROGRESS NOTES
Chief Complaint  Establish Care and Earache (Right ear sounds like ocean in ear )    Subjective         Rosanna Mandujano presents to [facility]  Previous PCP: Latia Isidro   Last labs: 2019  LMP: Nine months ago  PAP: 2018, due  Mammogram: 2018, due; Denies family history of breast cancer  Colonoscopy: Denies family history of colon cancer  Influenza vaccination: Declines  COVID19 vaccination: Up to date  Shingles vaccination: Will consider  Eye exam: 2021  Dental exam: Dentures  Smoking history: Quit smoking 2017, smoked 3 PPD x 20 years, now vaping  Chest CT scan: Due at age 48  Specialists: None     Iron deficiency-  History of treatment with infusions, last one a year ago. Has not followed up with hematology recently, states she can usually tell when she feels low.  Patient with history of gastric bypass in 2005, has lost close to 250 pounds.    Depression/anxiety-  Managed with Lexapro, depression well controlled with medication but anxiety is not. Has previously tried Prozac, Zoloft, Paxil (had suicidal ideations). Has Buspar but will not take because this makes her feel off.  Patient states she does not like to take medications that make her feel funny and weird.  She would be interested in an alternative to help with anxiety but is happy with Lexapro for depression.  Patient admits to high anxiety, often stresses about her health and postpones procedures due to fear that something is wrong.  Denies SI/HI.     Palpitations-  Patient reports palpitations, often a few times a week.  Will occur with and without anxiety.  Sometimes she will feel lightheaded when these occur.  Has had episodes where they wake her up at night.  Denies chest pain, shortness of breath, diaphoresis.    Patient reports she had a cold approximately two weeks ago, has since felt like she has an ocean in her right ear. Denies pain, discharge. Has not been able to hear as well.  "Went to Urgent Care initially, was treated with a nasal spray and cefdinir.     Patient reports right sided back, flank pain, axillary/right upper chest x a few weeks.  Sometimes worse with breathing intermittent, daily. Describes as a dull pain, sometimes sharp. Typically lasts a few minutes. Denies shortness of breath, cough, wheezing, dysuria, hematuria. Has never experienced this before. Denies injury or frequent exercise.           Objective     Vitals:    10/11/22 0810   BP: 122/76   BP Location: Right arm   Patient Position: Sitting   Cuff Size: Adult   Pulse: 73   Temp: 98.2 °F (36.8 °C)   TempSrc: Infrared   SpO2: 99%   Weight: 68.7 kg (151 lb 6 oz)   Height: 163.4 cm (64.33\")      Body mass index is 25.72 kg/m².    Wt Readings from Last 3 Encounters:   10/11/22 68.7 kg (151 lb 6 oz)   09/23/22 68.9 kg (152 lb)   10/18/19 76.7 kg (169 lb 1.5 oz)     BP Readings from Last 3 Encounters:   10/11/22 122/76   09/23/22 109/63   06/26/22 125/72                Physical Exam  Constitutional:       Appearance: Normal appearance.   HENT:      Head: Normocephalic and atraumatic.      Ears:      Comments: Bilateral TM nonpurulent effusion     Nose: Nose normal.      Mouth/Throat:      Mouth: Mucous membranes are moist.      Pharynx: Oropharynx is clear.   Eyes:      Extraocular Movements: Extraocular movements intact.      Conjunctiva/sclera: Conjunctivae normal.      Pupils: Pupils are equal, round, and reactive to light.   Neck:      Thyroid: No thyroid mass, thyromegaly or thyroid tenderness.   Cardiovascular:      Rate and Rhythm: Normal rate and regular rhythm.      Heart sounds: Normal heart sounds.   Pulmonary:      Effort: Pulmonary effort is normal.      Breath sounds: Normal breath sounds.   Abdominal:      General: Bowel sounds are normal.      Palpations: Abdomen is soft.   Skin:     General: Skin is warm and dry.   Neurological:      General: No focal deficit present.      Mental Status: She is alert and " oriented to person, place, and time.   Psychiatric:         Mood and Affect: Mood normal.         Behavior: Behavior normal.         Thought Content: Thought content normal.          Result Review :   The following data was reviewed by: YAZAN Taylor on 10/11/2022:      Procedures    Assessment and Plan   Diagnoses and all orders for this visit:    1. Encounter to establish care (Primary)  -     Comprehensive Metabolic Panel  -     CBC & Differential  -     TSH  -     Lipid Panel    2. Iron deficiency  Assessment & Plan:  History of infusion treatment, iron studies today.    Orders:  -     Iron Profile  -     Ferritin    3. Major depressive disorder, recurrent, mild (HCC)  Assessment & Plan:  Depression well-controlled, continue Lexapro.  Patient continues with flares of anxiety, does not tolerate BuSpar.  Discussed Wellbutrin, patient would like to proceed with medication.  Denies history of seizures.  Discussed potential for lightheadedness, headache, abnormal dreams.  She is aware of increased risk of suicidal thoughts with this medication and will discontinue immediately with concerns.  Follow-up in 1 month to evaluate medication effectiveness, sooner if concerns arise patient denies SI/HI.      4. Anxiety    5. Palpitations  Assessment & Plan:  Routine labs today, including thyroid.  Discussed with patient that symptoms can be secondary to anemia, thyroid dysfunction.  Also schedule for Holter monitor to rule out underlying etiology.  She should seek medical attention immediately with worsening palpitations, chest pain, diaphoresis, shortness of breath.    Orders:  -     XR Chest PA & Lateral (In Office)  -     T4, Free  -     Holter monitor - 24 hour; Future    6. Flank pain  Assessment & Plan:  Likely musculoskeletal, differential also included potential for pneumonia secondary to recent upper respiratory infection and UTI/nephrolithiasis.  Abdominal exam normal. UA and CXR in clinic without concern.   Will treat conservatively with stretching as tolerated, ice/heat, NSAIDs PRN. Could consider further imaging in the future if symptoms worsen or persist.     Orders:  -     XR Chest PA & Lateral (In Office)  -     Urinalysis With Culture If Indicated -; Future  -     Urinalysis With Culture If Indicated - Urine, Clean Catch    7. Sensation of fullness in right ear  Assessment & Plan:  Bilateral TM nonpurulent effusion.  Flonase to pharmacy.  Patient will call or return to clinic if symptoms worsen or persist.      8. Visit for screening mammogram  Comments:  Mammogram ordered.  Orders:  -     Mammo Screening Digital Tomosynthesis Bilateral With CAD; Future    9. Screening for colon cancer  Comments:  Colonoscopy ordered.  Orders:  -     Ambulatory Referral For Screening Colonoscopy    10. Current every day vaping  Assessment & Plan:  Patient aware of risks, not interested in cessation at this time      11. Stopped smoking with greater than 30 pack year history  Assessment & Plan:  Low-dose chest CT scan to be started at age 48.      Other orders  -     buPROPion SR (Wellbutrin SR) 150 MG 12 hr tablet; Take one tablet once daily x 3 days, increase to twice daily if well tolerated  Dispense: 60 tablet; Refill: 1  -     fluticasone (Flonase) 50 MCG/ACT nasal spray; 2 sprays into the nostril(s) as directed by provider Daily.  Dispense: 16 g; Refill: 0      I spent 65 minutes caring for Rosanna on this date of service. This time includes time spent by me in the following activities:preparing for the visit, reviewing tests, obtaining and/or reviewing a separately obtained history, performing a medically appropriate examination and/or evaluation , counseling and educating the patient/family/caregiver, ordering medications, tests, or procedures and documenting information in the medical record  Follow Up   Return in about 1 month (around 11/11/2022).  Patient was given instructions and counseling regarding her condition  or for health maintenance advice. Please see specific information pulled into the AVS if appropriate.

## 2022-10-11 NOTE — ASSESSMENT & PLAN NOTE
Routine labs today, including thyroid.  Discussed with patient that symptoms can be secondary to anemia, thyroid dysfunction.  Also schedule for Holter monitor to rule out underlying etiology.  She should seek medical attention immediately with worsening palpitations, chest pain, diaphoresis, shortness of breath.

## 2022-10-11 NOTE — ASSESSMENT & PLAN NOTE
Likely musculoskeletal, differential also included potential for pneumonia secondary to recent upper respiratory infection and UTI/nephrolithiasis.  Abdominal exam normal. UA and CXR in clinic without concern.  Will treat conservatively with stretching as tolerated, ice/heat, NSAIDs PRN. Could consider further imaging in the future if symptoms worsen or persist.

## 2022-10-11 NOTE — ASSESSMENT & PLAN NOTE
Bilateral TM nonpurulent effusion.  Flonase to pharmacy.  Patient will call or return to clinic if symptoms worsen or persist.

## 2022-10-17 LAB
BILIRUB UR QL STRIP: NEGATIVE
CLARITY UR: CLEAR
COLOR UR: YELLOW
GLUCOSE UR STRIP-MCNC: NEGATIVE MG/DL
HGB UR QL STRIP.AUTO: NEGATIVE
KETONES UR QL STRIP: NEGATIVE
LEUKOCYTE ESTERASE UR QL STRIP.AUTO: NEGATIVE
NITRITE UR QL STRIP: NEGATIVE
PH UR STRIP.AUTO: 7 [PH] (ref 5–8)
PROT UR QL STRIP: NEGATIVE
SP GR UR STRIP: 1.01 (ref 1–1.03)
UROBILINOGEN UR QL STRIP: NORMAL

## 2022-11-22 ENCOUNTER — APPOINTMENT (OUTPATIENT)
Dept: CARDIOLOGY | Facility: HOSPITAL | Age: 47
End: 2022-11-22

## 2022-12-14 ENCOUNTER — TELEPHONE (OUTPATIENT)
Dept: SURGERY | Facility: CLINIC | Age: 47
End: 2022-12-14

## 2022-12-14 NOTE — TELEPHONE ENCOUNTER
SPOKE TO DAVE AT [facility]S OFFICE TO INFORM PT CX NEW PT APPT WITH Angy PELLETIER FROM 12/15/MS

## 2022-12-21 ENCOUNTER — APPOINTMENT (OUTPATIENT)
Dept: MAMMOGRAPHY | Facility: HOSPITAL | Age: 47
End: 2022-12-21

## 2023-03-06 ENCOUNTER — HOSPITAL ENCOUNTER (OUTPATIENT)
Dept: CARDIOLOGY | Facility: HOSPITAL | Age: 48
Discharge: HOME | End: 2023-03-06
Admitting: NURSE PRACTITIONER
Payer: COMMERCIAL

## 2023-03-06 DIAGNOSIS — R00.2 PALPITATIONS: ICD-10-CM

## 2023-03-06 PROCEDURE — 93225 XTRNL ECG REC<48 HRS REC: CPT

## 2023-03-09 LAB
MAXIMAL PREDICTED HEART RATE: 171 BPM
STRESS TARGET HR: 145 BPM

## 2023-03-09 PROCEDURE — 93227 XTRNL ECG REC<48 HR R&I: CPT | Performed by: INTERNAL MEDICINE

## 2023-04-25 ENCOUNTER — OFFICE VISIT (OUTPATIENT)
Dept: INTERNAL MEDICINE | Facility: CLINIC | Age: 48
End: 2023-04-25
Payer: COMMERCIAL

## 2023-04-25 VITALS
OXYGEN SATURATION: 97 % | HEART RATE: 60 BPM | HEIGHT: 64 IN | TEMPERATURE: 97.6 F | RESPIRATION RATE: 16 BRPM | BODY MASS INDEX: 25.44 KG/M2 | DIASTOLIC BLOOD PRESSURE: 70 MMHG | SYSTOLIC BLOOD PRESSURE: 92 MMHG | WEIGHT: 149 LBS

## 2023-04-25 DIAGNOSIS — F41.9 ANXIETY: ICD-10-CM

## 2023-04-25 DIAGNOSIS — Z12.31 VISIT FOR SCREENING MAMMOGRAM: ICD-10-CM

## 2023-04-25 DIAGNOSIS — F33.0 MAJOR DEPRESSIVE DISORDER, RECURRENT, MILD: ICD-10-CM

## 2023-04-25 DIAGNOSIS — N93.9 ABNORMAL UTERINE BLEEDING: ICD-10-CM

## 2023-04-25 DIAGNOSIS — Z01.419 ENCOUNTER FOR ROUTINE GYNECOLOGICAL EXAMINATION WITH PAPANICOLAOU SMEAR OF CERVIX: Primary | ICD-10-CM

## 2023-04-25 DIAGNOSIS — Z12.11 SCREENING FOR COLON CANCER: ICD-10-CM

## 2023-04-25 LAB
CANDIDA SPECIES: NEGATIVE
GARDNERELLA VAGINALIS: NEGATIVE
T VAGINALIS DNA VAG QL PROBE+SIG AMP: NEGATIVE

## 2023-04-25 PROCEDURE — 87660 TRICHOMONAS VAGIN DIR PROBE: CPT | Performed by: NURSE PRACTITIONER

## 2023-04-25 PROCEDURE — G0123 SCREEN CERV/VAG THIN LAYER: HCPCS | Performed by: NURSE PRACTITIONER

## 2023-04-25 PROCEDURE — 87510 GARDNER VAG DNA DIR PROBE: CPT | Performed by: NURSE PRACTITIONER

## 2023-04-25 PROCEDURE — 87480 CANDIDA DNA DIR PROBE: CPT | Performed by: NURSE PRACTITIONER

## 2023-04-25 RX ORDER — ESCITALOPRAM OXALATE 20 MG/1
20 TABLET ORAL DAILY
Qty: 90 TABLET | Refills: 1 | Status: SHIPPED | OUTPATIENT
Start: 2023-04-25

## 2023-04-25 NOTE — ASSESSMENT & PLAN NOTE
Anxiety/depression well controlled, continue Effexor. Patient should continue to monitor and seek medical attention immediately if she feels that her mental health is deteriorating. Denies SI/HI. Will continue to monitor.

## 2023-04-25 NOTE — ASSESSMENT & PLAN NOTE
Routine PAP today. Discussed preventative care with routine PAP smears and mammogram. Will determine further intervention based on results of PAP smear.

## 2023-04-25 NOTE — PROGRESS NOTES
"Chief Complaint  Vaginal Bleeding (Postmenopausal bleeding that lasted about a week)    Subjective         Rosanna Mandujano presents to [facility]  Patient reports close to one year ago she stopped having regular periods, would have intermittent spotting at times but not frequently. Last week patient had a week of bright red bleeding. States it felt similar to a period but was different than the periods he had in the past. Had been 10-12 months without a normal cycle. Patient reports last PAP smear was in 2018, this was normal. History of abnormal PAP smear with normal cone biopsy in 2016.     Would like refill of Effexor, states this is working well for her.   Agreeable to Cologuard and mammogram. Denies family history of breast or colon cancer.       Objective     Vitals:    04/25/23 0819   BP: 92/70   BP Location: Right arm   Patient Position: Sitting   Cuff Size: Adult   Pulse: 60   Resp: 16   Temp: 97.6 °F (36.4 °C)   TempSrc: Temporal   SpO2: 97%   Weight: 67.6 kg (149 lb)   Height: 163.4 cm (64.33\")      Body mass index is 25.31 kg/m².    Wt Readings from Last 3 Encounters:   04/25/23 67.6 kg (149 lb)   10/11/22 68.7 kg (151 lb 6 oz)   09/23/22 68.9 kg (152 lb)     BP Readings from Last 3 Encounters:   04/25/23 92/70   10/11/22 122/76   09/23/22 109/63                Physical Exam  Exam conducted with a chaperone present (Jayde HENDRIX).   Constitutional:       Appearance: Normal appearance.   HENT:      Head: Normocephalic and atraumatic.      Nose: Nose normal.      Mouth/Throat:      Mouth: Mucous membranes are moist.      Pharynx: Oropharynx is clear.   Eyes:      Extraocular Movements: Extraocular movements intact.      Conjunctiva/sclera: Conjunctivae normal.      Pupils: Pupils are equal, round, and reactive to light.   Cardiovascular:      Rate and Rhythm: Normal rate and regular rhythm.      Heart sounds: Normal heart sounds.   Pulmonary:      Effort: " Pulmonary effort is normal.      Breath sounds: Normal breath sounds.   Chest:   Breasts:     Right: Normal.      Left: Normal.   Genitourinary:     Vagina: Normal.      Cervix: Friability and cervical bleeding present.      Uterus: Normal.       Adnexa: Right adnexa normal and left adnexa normal.   Skin:     General: Skin is warm and dry.   Neurological:      General: No focal deficit present.      Mental Status: She is alert and oriented to person, place, and time.   Psychiatric:         Mood and Affect: Mood normal.         Behavior: Behavior normal.         Thought Content: Thought content normal.          Result Review :   The following data was reviewed by: YAZAN Taylor on 04/25/2023:      Procedures    Assessment and Plan   Diagnoses and all orders for this visit:    1. Encounter for routine gynecological examination with Papanicolaou smear of cervix (Primary)  Assessment & Plan:  Routine PAP today. Discussed preventative care with routine PAP smears and mammogram. Will determine further intervention based on results of PAP smear.      Orders:  -     IGP,rfx Aptima HPV All Pth  -     Gardnerella vaginalis, Trichomonas vaginalis, Candida albicans, DNA - Swab, Vagina    2. Abnormal uterine bleeding  Assessment & Plan:  Potentially normal premenopausal cycle as patient had not quite made it to one year cycle free. However, as this presented differently that her normal cycles will schedule for transvaginal ultrasound for further evaluation. Will determine if further intervention is warranted based on results of imaging.     Orders:  -     IGP,rfx Aptima HPV All Pth  -     Gardnerella vaginalis, Trichomonas vaginalis, Candida albicans, DNA - Swab, Vagina  -     US Non-ob Transvaginal; Future    3. Major depressive disorder, recurrent, mild  Assessment & Plan:  Anxiety/depression well controlled, continue Effexor. Patient should continue to monitor and seek medical attention immediately if she feels that her  mental health is deteriorating. Denies SI/HI. Will continue to monitor.         4. Anxiety    5. Visit for screening mammogram  Comments:  Mammogram ordered.  Orders:  -     Mammo Screening Digital Tomosynthesis Bilateral With CAD; Future    6. Screening for colon cancer  Comments:  Cologuard ordered. Patient aware that positive result warrants further evaluation with colonoscopy.   Orders:  -     Cologuard - Stool, Per Rectum; Future    Other orders  -     escitalopram (LEXAPRO) 20 MG tablet; Take 1 tablet by mouth Daily.  Dispense: 90 tablet; Refill: 1        Follow Up   Return in about 6 months (around 10/25/2023), or if symptoms worsen or fail to improve.  Patient was given instructions and counseling regarding her condition or for health maintenance advice. Please see specific information pulled into the AVS if appropriate.

## 2023-04-25 NOTE — ASSESSMENT & PLAN NOTE
Potentially normal premenopausal cycle as patient had not quite made it to one year cycle free. However, as this presented differently that her normal cycles will schedule for transvaginal ultrasound for further evaluation. Will determine if further intervention is warranted based on results of imaging.

## 2023-05-02 LAB
CONV .: NORMAL
CYTOLOGIST CVX/VAG CYTO: NORMAL
CYTOLOGY CVX/VAG DOC CYTO: NORMAL
CYTOLOGY CVX/VAG DOC THIN PREP: NORMAL
DX ICD CODE: NORMAL
HIV 1 & 2 AB SER-IMP: NORMAL
Lab: NORMAL
OTHER STN SPEC: NORMAL
STAT OF ADQ CVX/VAG CYTO-IMP: NORMAL

## 2023-11-01 ENCOUNTER — OFFICE VISIT (OUTPATIENT)
Dept: ORTHOPEDIC SURGERY | Facility: CLINIC | Age: 48
End: 2023-11-01
Payer: COMMERCIAL

## 2023-11-01 VITALS — HEIGHT: 64 IN | WEIGHT: 154 LBS | HEART RATE: 63 BPM | BODY MASS INDEX: 26.29 KG/M2 | OXYGEN SATURATION: 98 %

## 2023-11-01 DIAGNOSIS — S89.92XA INJURY OF LEFT KNEE, INITIAL ENCOUNTER: Primary | ICD-10-CM

## 2023-11-01 NOTE — PROGRESS NOTES
"Chief Complaint  Pain and Initial Evaluation of the Left Knee    Subjective          Rosanna Mandujano presents to [facility] for   History of Present Illness    The patient presents here today for evaluation of the left knee. The patient reports she tried catching her mother when she started to fall causing her to fall. She reports pain to the left lower extremity. She reports pain with ROM of the left knee. She denies previous surgery. She reports pain with weight bearing. She is wearing a knee brace and ambulating with crutches.     Allergies   Allergen Reactions    Morphine Other (See Comments)     Pt reports that morphine drops her b/p     Amoxicillin Itching    Azithromycin Itching        Social History     Socioeconomic History    Marital status:    Tobacco Use    Smoking status: Former     Packs/day: 2.00     Years: 15.00     Additional pack years: 0.00     Total pack years: 30.00     Types: Cigarettes, Electronic Cigarette    Smokeless tobacco: Never   Vaping Use    Vaping Use: Every day    Substances: Nicotine    Devices: Refillable tank   Substance and Sexual Activity    Alcohol use: Not Currently    Drug use: Never    Sexual activity: Defer        I reviewed the patient's chief complaint, history of present illness, review of systems, past medical history, surgical history, family history, social history, medications, and allergy list.     REVIEW OF SYSTEMS    Constitutional: Denies fevers, chills, weight loss  Cardiovascular: Denies chest pain, shortness of breath  Skin: Denies rashes, acute skin changes  Neurologic: Denies headache, loss of consciousness  MSK: Left knee pain      Objective   Vital Signs:   Pulse 63   Ht 162.6 cm (64\")   Wt 69.9 kg (154 lb)   SpO2 98%   BMI 26.43 kg/m²     Body mass index is 26.43 kg/m².    Physical Exam    General: Alert. No acute distress.   Left knee- Positive EHL, FHL, GS and TA. Sensation intact to all 5 nerves of the " foot. Positive pulses. No effusion. Non-tender to the patella tendon. Mild tenderness to quad tendon but appears intact. Stable to varus/valgus stress. Lacks 5 of extension. Flexion 90 degrees. No medial tenderness. Lateral joint line tenderness. Stable to anterior/posterior drawer. Medial and lateral pain with Nandini's, no pop. Calf soft. Smooth hip motion.     Procedures    Imaging Results (Most Recent)       None                     Assessment and Plan        XR Hip With or Without Pelvis 2 - 3 View Left    Result Date: 10/30/2023  Narrative: PROCEDURE: XR HIP W OR WO PELVIS 2-3 VIEW LEFT  COMPARISON: None  INDICATIONS: fall pain  FINDINGS:  Mineralization and osseous alignment appear within normal limits.  Sacroiliac joints and hip joint spaces appear grossly preserved.  There is minimal osteophyte formation at the bilateral hips.  No definite acute displaced fracture.  Soft tissues appear unremarkable.      Impression:   1. No radiographic findings of acute osseous abnormality. 2. Minimal osteophyte formation without definite hip joint space narrowing.      MEGHANN RUIZ MD       Electronically Signed and Approved By: MEGHANN RUIZ MD on 10/30/2023 at 16:24             XR Knee 4+ View Left    Result Date: 10/30/2023  Narrative: PROCEDURE: XR KNEE 4+ VW LEFT  COMPARISON: None  INDICATIONS: Fall  FINDINGS:  There is no acute fracture or dislocation.  The joint spaces appear normally aligned and well maintained.  There is no osseous erosion or chondrocalcinosis.  Bone mineralization is normal.  There is no joint effusion.      Impression:   1. No acute osseous abnormality or significant radiographic degenerative changes of the left knee.  No joint effusion.      JULIA NELSON MD       Electronically Signed and Approved By: JULIA NELSON MD on 10/30/2023 at 16:24               Diagnoses and all orders for this visit:    1. Injury of left knee, initial encounter (Primary)  -     MRI Knee Left Without  Contrast; Future        Discussed the treatment plan with the patient.  I reviewed the previous x-rays with the patient. Plan for MRI of the left knee to evaluate for internal derangement. She can WBAT with crutches and brace. Plan to work on gentle ROM of the knee.       Educated on risk of smoking/nicotine. Discussed options for smoking cessation. and Call or return if worsening symptoms.    Scribed for Toby Duke MD by Ute Kathleen  11/01/2023   10:51 EDT         Follow Up       MRI results    Patient was given instructions and counseling regarding her condition or for health maintenance advice. Please see specific information pulled into the AVS if appropriate.       I have personally performed the services described in this document as scribed by the above individual and it is both accurate and complete.     Toby Duke MD  11/01/23  10:58 EDT

## 2023-11-22 ENCOUNTER — HOSPITAL ENCOUNTER (OUTPATIENT)
Dept: MRI IMAGING | Facility: HOSPITAL | Age: 48
Discharge: HOME | End: 2023-11-22
Admitting: STUDENT IN AN ORGANIZED HEALTH CARE EDUCATION/TRAINING PROGRAM
Payer: COMMERCIAL

## 2023-11-22 DIAGNOSIS — S89.92XA INJURY OF LEFT KNEE, INITIAL ENCOUNTER: ICD-10-CM

## 2023-11-22 PROCEDURE — 73721 MRI JNT OF LWR EXTRE W/O DYE: CPT

## 2023-11-27 ENCOUNTER — OFFICE VISIT (OUTPATIENT)
Dept: ORTHOPEDIC SURGERY | Facility: CLINIC | Age: 48
End: 2023-11-27
Payer: COMMERCIAL

## 2023-11-27 VITALS
SYSTOLIC BLOOD PRESSURE: 101 MMHG | BODY MASS INDEX: 26.29 KG/M2 | HEIGHT: 64 IN | HEART RATE: 76 BPM | WEIGHT: 154 LBS | DIASTOLIC BLOOD PRESSURE: 65 MMHG | OXYGEN SATURATION: 97 %

## 2023-11-27 DIAGNOSIS — M17.12 OSTEOARTHRITIS OF LEFT KNEE, UNSPECIFIED OSTEOARTHRITIS TYPE: ICD-10-CM

## 2023-11-27 DIAGNOSIS — S83.282A TEAR OF LATERAL MENISCUS OF LEFT KNEE, CURRENT, UNSPECIFIED TEAR TYPE, INITIAL ENCOUNTER: Primary | ICD-10-CM

## 2023-11-27 PROCEDURE — 99214 OFFICE O/P EST MOD 30 MIN: CPT | Performed by: STUDENT IN AN ORGANIZED HEALTH CARE EDUCATION/TRAINING PROGRAM

## 2023-11-27 NOTE — PROGRESS NOTES
"Chief Complaint  Follow-up and Pain of the Left Knee    Subjective          Rosanna Mandujano presents to [facility] for   History of Present Illness    The patient presents here today for follow up evaluation of the left knee. The patient recently had an MRI and is here today for those results. To review, The patient reports she tried catching her mother when she started to fall causing her to fall. She reports pain to the left lower extremity. She reports pain with ROM of the left knee. She denies previous surgery. She reports pain with weight bearing.   Allergies   Allergen Reactions    Morphine Other (See Comments)     Pt reports that morphine drops her b/p     Amoxicillin Itching    Azithromycin Itching        Social History     Socioeconomic History    Marital status:    Tobacco Use    Smoking status: Former     Packs/day: 2.00     Years: 15.00     Additional pack years: 0.00     Total pack years: 30.00     Types: Cigarettes, Electronic Cigarette    Smokeless tobacco: Never   Vaping Use    Vaping Use: Every day    Substances: Nicotine    Devices: Refillable tank   Substance and Sexual Activity    Alcohol use: Not Currently    Drug use: Never    Sexual activity: Defer        I reviewed the patient's chief complaint, history of present illness, review of systems, past medical history, surgical history, family history, social history, medications, and allergy list.     REVIEW OF SYSTEMS    Constitutional: Denies fevers, chills, weight loss  Cardiovascular: Denies chest pain, shortness of breath  Skin: Denies rashes, acute skin changes  Neurologic: Denies headache, loss of consciousness  MSK: Left knee pain      Objective   Vital Signs:   /65   Pulse 76   Ht 162.6 cm (64\")   Wt 69.9 kg (154 lb)   SpO2 97%   BMI 26.43 kg/m²     Body mass index is 26.43 kg/m².    Physical Exam    General: Alert. No acute distress.   Left knee- Positive EHL, FHL, GS and TA. " Sensation intact to all 5 nerves of the foot. Positive pulses. No effusion. Non-tender to the patella tendon. Mild tenderness to quad tendon but appears intact. Stable to varus/valgus stress. Lacks 5 of extension. Flexion 90 degrees. No medial tenderness. Lateral joint line tenderness. Stable to anterior/posterior drawer. Medial and lateral pain with Nandini's, no pop. Calf soft. Smooth hip motion.      Procedures    Imaging Results (Most Recent)       None                     Assessment and Plan        MRI Knee Left Without Contrast    Result Date: 11/22/2023  Narrative: PROCEDURE: MRI KNEE LEFT  WO CONTRAST  COMPARISON: None  INDICATIONS: Knee trauma, meniscal/ligament injury suspected, xray done (Age >= 1y)      TECHNIQUE: A complete multi-planar MRI was performed.   FINDINGS:  The cruciate ligaments, collateral ligaments, and extensor mechanism of the knee are intact.  There is evidence for a horizontal peripheral meniscal tear involving the anterior horn and interior body segment of the lateral meniscus.  An associated parameniscal cyst is noted at the anterior margin the anterior horn measuring approximately 1.4 cm.  There is no evidence for medial meniscal tear.  Mild tricompartmental osteoarthritic degenerative changes are identified with evidence for articular cartilage irregularity/fissuring, subchondral edema/cystic change, and osteophytosis.  No significant full-thickness articular cartilage loss is observed.  There is a small joint effusion.  An incidental plica is noted in the patellofemoral compartment. No significant focal abnormal bone marrow signal is otherwise identified. The cortical margins are intact. No significant abnormal focal fluid collection is observed within the surrounding soft tissues.      Impression:   1. Evidence for a peripheral horizontal type meniscal tear involving the anterior horn and anterior body segment of the lateral meniscus.  There is an associated parameniscal cyst  measuring approximately 1.4 cm. 2. Mild tricompartmental osteoarthritis.  No significant full-thickness articular cartilage loss is observed.       JUS DUQUE MD       Electronically Signed and Approved By: JUS DUQUE MD on 11/22/2023 at 14:07             XR Hip With or Without Pelvis 2 - 3 View Left    Result Date: 10/30/2023  Narrative: PROCEDURE: XR HIP W OR WO PELVIS 2-3 VIEW LEFT  COMPARISON: None  INDICATIONS: fall pain  FINDINGS:  Mineralization and osseous alignment appear within normal limits.  Sacroiliac joints and hip joint spaces appear grossly preserved.  There is minimal osteophyte formation at the bilateral hips.  No definite acute displaced fracture.  Soft tissues appear unremarkable.      Impression:   1. No radiographic findings of acute osseous abnormality. 2. Minimal osteophyte formation without definite hip joint space narrowing.      MEGHANN RUIZ MD       Electronically Signed and Approved By: MEGHANN RUIZ MD on 10/30/2023 at 16:24             XR Knee 4+ View Left    Result Date: 10/30/2023  Narrative: PROCEDURE: XR KNEE 4+ VW LEFT  COMPARISON: None  INDICATIONS: Fall  FINDINGS:  There is no acute fracture or dislocation.  The joint spaces appear normally aligned and well maintained.  There is no osseous erosion or chondrocalcinosis.  Bone mineralization is normal.  There is no joint effusion.      Impression:   1. No acute osseous abnormality or significant radiographic degenerative changes of the left knee.  No joint effusion.      JULIA NELSON MD       Electronically Signed and Approved By: JULIA NELSON MD on 10/30/2023 at 16:24               Diagnoses and all orders for this visit:    1. Tear of lateral meniscus of left knee, current, unspecified tear type, initial encounter (Primary)  -     Ambulatory Referral to Physical Therapy Evaluate and treat, Ortho; Stretching, ROM, Strengthening; Full weight bearing    2. Osteoarthritis of left knee, unspecified osteoarthritis  type  -     Ambulatory Referral to Physical Therapy Evaluate and treat, Ortho; Stretching, ROM, Strengthening; Full weight bearing        Discussed the treatment options with the patient, operative vs non-operative. Discussed the risks and benefits of a left knee arthroscopic partial medial menisectomy and chondroplasty. The patient expressed understanding and wished to proceed.     Discussed surgery., Risks/benefits discussed with patient including, but not limited to: infection, bleeding, neurovascular damage, re-rupture, aesthetic deformity, need for further surgery, and death., Discussed with patient the implant type being used during surgery and patient understands., Surgery pamphlet given., Call or return if worsening symptoms., and IFC can help extend pain relief and hopefully reduce need for pain medication. TENS is used to control break through pain. NMES is used to help and strengthen weak muscles and prevent atrophy.    Scribed for Toby Duke MD by Ute Kathleen  11/27/2023   15:07 EST         Follow Up       2 weeks postoperatively.      Patient was given instructions and counseling regarding her condition or for health maintenance advice. Please see specific information pulled into the AVS if appropriate.       I have personally performed the services described in this document as scribed by the above individual and it is both accurate and complete.     Toby Duke MD  11/27/23  15:23 EST

## 2024-02-07 ENCOUNTER — OFFICE VISIT (OUTPATIENT)
Dept: ORTHOPEDIC SURGERY | Facility: CLINIC | Age: 49
End: 2024-02-07
Payer: COMMERCIAL

## 2024-02-07 ENCOUNTER — PREP FOR SURGERY (OUTPATIENT)
Dept: OTHER | Facility: HOSPITAL | Age: 49
End: 2024-02-07
Payer: COMMERCIAL

## 2024-02-07 VITALS — OXYGEN SATURATION: 98 % | SYSTOLIC BLOOD PRESSURE: 121 MMHG | HEART RATE: 74 BPM | DIASTOLIC BLOOD PRESSURE: 84 MMHG

## 2024-02-07 DIAGNOSIS — S89.92XA INJURY OF LEFT KNEE, INITIAL ENCOUNTER: ICD-10-CM

## 2024-02-07 DIAGNOSIS — M25.562 LEFT KNEE PAIN, UNSPECIFIED CHRONICITY: ICD-10-CM

## 2024-02-07 DIAGNOSIS — S83.282D TEAR OF LATERAL MENISCUS OF LEFT KNEE, CURRENT, UNSPECIFIED TEAR TYPE, SUBSEQUENT ENCOUNTER: Primary | ICD-10-CM

## 2024-02-07 DIAGNOSIS — M17.12 OSTEOARTHRITIS OF LEFT KNEE, UNSPECIFIED OSTEOARTHRITIS TYPE: ICD-10-CM

## 2024-02-07 DIAGNOSIS — S83.282A TEAR OF LATERAL MENISCUS OF LEFT KNEE, CURRENT, UNSPECIFIED TEAR TYPE, INITIAL ENCOUNTER: Primary | ICD-10-CM

## 2024-02-07 RX ORDER — CEFAZOLIN SODIUM IN 0.9 % NACL 3 G/100 ML
3 INTRAVENOUS SOLUTION, PIGGYBACK (ML) INTRAVENOUS ONCE
OUTPATIENT
Start: 2024-02-07 | End: 2024-02-07

## 2024-02-07 RX ORDER — CEFAZOLIN SODIUM 2 G/100ML
2 INJECTION, SOLUTION INTRAVENOUS ONCE
OUTPATIENT
Start: 2024-02-07 | End: 2024-02-07

## 2024-02-07 NOTE — PROGRESS NOTES
Chief Complaint  Follow-up of the Left Knee    Subjective      Rosanna Mandujano presents to [facility] for follow up of her left knee.  Patient has a known meniscal tear well as mild tricompartmental osteoarthritis that she has been treating conservatively.  During her last office visit with Dr. Duke on 11/27/2023 operative versus nonoperative management was discussed.  Physical therapy order was placed for her at that time as well.    Today she states she was doing well and did not want to consider surgery after her previous appointment, however, as a few days ago she started to have severe pain again.  She states that she had no injuries and has no idea why the pain has suddenly gotten worse but she is ready to do something about it.    Allergies   Allergen Reactions    Morphine Other (See Comments)     Pt reports that morphine drops her b/p     Amoxicillin Itching    Azithromycin Itching       Objective     Vital Signs:   Vitals:    02/07/24 1030   BP: 121/84   Pulse: 74   SpO2: 98%   PainSc:   7     There is no height or weight on file to calculate BMI.    I reviewed the patient's chief complaint, history of present illness, review of systems, past medical history, surgical history, family history, social history, medications, and allergy list.     REVIEW OF SYSTEMS    Constitutional: Denies fevers, chills, weight loss  Cardiovascular: Denies chest pain, shortness of breath  Skin: Denies rashes, acute skin changes  Neurologic: Denies headache, loss of consciousness  MSK: Left knee pain.     Ortho Exam  Left lower extremity: No knee effusion.  Full knee extension.  Knee flexion 120 degrees.  Described tenderness along lateral & medial joint line.  Demonstrates active ankle dorsiflexion and plantarflexion without associated pain or stiffness.  Sensation intact.  Neurovascular intact.          Imaging Results (Most Recent)       None                Assessment and Plan    Diagnoses and all orders for this visit:    1. Tear of lateral meniscus of left knee, current, unspecified tear type, subsequent encounter (Primary)    2. Injury of left knee, initial encounter    3. Left knee pain, unspecified chronicity         Rosanna Mandujano presents today following up on her left knee.  Patient has a known meniscus tear that she has been treating conservatively.  We discussed conservative measures versus nonoperative. Discussed the risks and benefits of a left knee arthroscopic partial medial menisectomy and chondroplasty. The patient expressed understanding and wished to proceed.      Surgery details were discussed by Dr. Duke.  Discussion included: Risks/benefits discussed with patient including, but not limited to: infection, bleeding, neurovascular damage, re-rupture, aesthetic deformity, need for further surgery, and death., Discussed with patient the implant type being used during surgery and patient understands., Surgery pamphlet given., Call or return if worsening symptoms., and IFC can help extend pain relief and hopefully reduce need for pain medication. TENS is used to control break through pain. NMES is used to help and strengthen weak muscles and prevent atrophy.    Patient verbalized understanding and elected to proceed.    Will follow-up 2 weeks postop.      Tobacco Use: Medium Risk (2/7/2024)    Patient History     Smoking Tobacco Use: Former     Smokeless Tobacco Use: Never     Passive Exposure: Not on file     Patient reports that they are a nonsmoker; cessation education not applicable.            Follow Up   No follow-ups on file.  There are no Patient Instructions on file for this visit.  Patient was given instructions and counseling regarding her condition or for health maintenance advice. Please see specific information pulled into the AVS if appropriate.

## 2024-03-11 ENCOUNTER — TELEPHONE (OUTPATIENT)
Dept: ORTHOPEDIC SURGERY | Facility: CLINIC | Age: 49
End: 2024-03-11
Payer: COMMERCIAL

## 2024-03-11 NOTE — TELEPHONE ENCOUNTER
PER [facility] - PATIENT DID NOT HAVE SURGERY ON 3/5/2024. ATTEMPTED TO CALL PATIENT, NO ANSWER, LVM FOR PATIENT TO RETURN MY CALL BACK AT THE OFFICE IF SHE WISHES TO RESCHEDULE SURGERY.     Scheduled Date MRN Procedures Reason Comments Lead Surgeon Service Location Primary Cv   3/5/2024 [id number] Left Knee Arthroscopy With Partial Lateral Meniscectomy Possible Chonroplasty Patient Request patient will call office to reschedule [facility]

## 2024-05-03 ENCOUNTER — TELEPHONE (OUTPATIENT)
Dept: INTERNAL MEDICINE | Facility: CLINIC | Age: 49
End: 2024-05-03
Payer: COMMERCIAL

## 2024-05-07 ENCOUNTER — OFFICE VISIT (OUTPATIENT)
Dept: INTERNAL MEDICINE | Facility: CLINIC | Age: 49
End: 2024-05-07
Payer: COMMERCIAL

## 2024-05-07 VITALS
RESPIRATION RATE: 18 BRPM | WEIGHT: 169.4 LBS | TEMPERATURE: 97 F | DIASTOLIC BLOOD PRESSURE: 60 MMHG | BODY MASS INDEX: 28.92 KG/M2 | SYSTOLIC BLOOD PRESSURE: 92 MMHG | OXYGEN SATURATION: 98 % | HEART RATE: 75 BPM | HEIGHT: 64 IN

## 2024-05-07 DIAGNOSIS — D50.9 IRON DEFICIENCY ANEMIA, UNSPECIFIED IRON DEFICIENCY ANEMIA TYPE: Primary | ICD-10-CM

## 2024-05-07 DIAGNOSIS — Z98.84 H/O GASTRIC BYPASS: ICD-10-CM

## 2024-05-07 DIAGNOSIS — Z13.220 LIPID SCREENING: ICD-10-CM

## 2024-05-07 DIAGNOSIS — Z13.29 THYROID DISORDER SCREEN: ICD-10-CM

## 2024-05-07 LAB
ALBUMIN SERPL-MCNC: 4.2 G/DL (ref 3.5–5.2)
ALBUMIN/GLOB SERPL: 1.6 G/DL
ALP SERPL-CCNC: 84 U/L (ref 39–117)
ALT SERPL W P-5'-P-CCNC: 14 U/L (ref 1–33)
ANION GAP SERPL CALCULATED.3IONS-SCNC: 10 MMOL/L (ref 5–15)
AST SERPL-CCNC: 12 U/L (ref 1–32)
BASOPHILS # BLD AUTO: 0.03 10*3/MM3 (ref 0–0.2)
BASOPHILS NFR BLD AUTO: 0.8 % (ref 0–1.5)
BILIRUB SERPL-MCNC: 0.3 MG/DL (ref 0–1.2)
BUN SERPL-MCNC: 15 MG/DL (ref 6–20)
BUN/CREAT SERPL: 26.8 (ref 7–25)
CALCIUM SPEC-SCNC: 9 MG/DL (ref 8.6–10.5)
CHLORIDE SERPL-SCNC: 106 MMOL/L (ref 98–107)
CHOLEST SERPL-MCNC: 190 MG/DL (ref 0–200)
CO2 SERPL-SCNC: 25 MMOL/L (ref 22–29)
CREAT SERPL-MCNC: 0.56 MG/DL (ref 0.57–1)
DEPRECATED RDW RBC AUTO: 35.2 FL (ref 37–54)
EGFRCR SERPLBLD CKD-EPI 2021: 110.7 ML/MIN/1.73
EOSINOPHIL # BLD AUTO: 0.24 10*3/MM3 (ref 0–0.4)
EOSINOPHIL NFR BLD AUTO: 6.6 % (ref 0.3–6.2)
ERYTHROCYTE [DISTWIDTH] IN BLOOD BY AUTOMATED COUNT: 12.5 % (ref 12.3–15.4)
FOLATE SERPL-MCNC: 14 NG/ML (ref 4.78–24.2)
GLOBULIN UR ELPH-MCNC: 2.7 GM/DL
GLUCOSE SERPL-MCNC: 84 MG/DL (ref 65–99)
HCT VFR BLD AUTO: 36 % (ref 34–46.6)
HDLC SERPL-MCNC: 69 MG/DL (ref 40–60)
HGB BLD-MCNC: 11.6 G/DL (ref 12–15.9)
IMM GRANULOCYTES # BLD AUTO: 0.01 10*3/MM3 (ref 0–0.05)
IMM GRANULOCYTES NFR BLD AUTO: 0.3 % (ref 0–0.5)
IRON 24H UR-MRATE: 69 MCG/DL (ref 37–145)
IRON SATN MFR SERPL: 15 % (ref 20–50)
LDLC SERPL CALC-MCNC: 111 MG/DL (ref 0–100)
LDLC/HDLC SERPL: 1.59 {RATIO}
LYMPHOCYTES # BLD AUTO: 1.49 10*3/MM3 (ref 0.7–3.1)
LYMPHOCYTES NFR BLD AUTO: 40.7 % (ref 19.6–45.3)
MCH RBC QN AUTO: 25.2 PG (ref 26.6–33)
MCHC RBC AUTO-ENTMCNC: 32.2 G/DL (ref 31.5–35.7)
MCV RBC AUTO: 78.3 FL (ref 79–97)
MONOCYTES # BLD AUTO: 0.35 10*3/MM3 (ref 0.1–0.9)
MONOCYTES NFR BLD AUTO: 9.6 % (ref 5–12)
NEUTROPHILS NFR BLD AUTO: 1.54 10*3/MM3 (ref 1.7–7)
NEUTROPHILS NFR BLD AUTO: 42 % (ref 42.7–76)
NRBC BLD AUTO-RTO: 0 /100 WBC (ref 0–0.2)
PLATELET # BLD AUTO: 239 10*3/MM3 (ref 140–450)
PMV BLD AUTO: 10.7 FL (ref 6–12)
POTASSIUM SERPL-SCNC: 4.2 MMOL/L (ref 3.5–5.2)
PROT SERPL-MCNC: 6.9 G/DL (ref 6–8.5)
RBC # BLD AUTO: 4.6 10*6/MM3 (ref 3.77–5.28)
SODIUM SERPL-SCNC: 141 MMOL/L (ref 136–145)
TIBC SERPL-MCNC: 468 MCG/DL (ref 298–536)
TRANSFERRIN SERPL-MCNC: 314 MG/DL (ref 200–360)
TRIGL SERPL-MCNC: 55 MG/DL (ref 0–150)
TSH SERPL DL<=0.05 MIU/L-ACNC: 0.58 UIU/ML (ref 0.27–4.2)
VIT B12 BLD-MCNC: 226 PG/ML (ref 211–946)
VLDLC SERPL-MCNC: 10 MG/DL (ref 5–40)
WBC NRBC COR # BLD AUTO: 3.66 10*3/MM3 (ref 3.4–10.8)

## 2024-05-07 PROCEDURE — 83540 ASSAY OF IRON: CPT | Performed by: NURSE PRACTITIONER

## 2024-05-07 PROCEDURE — 99214 OFFICE O/P EST MOD 30 MIN: CPT | Performed by: NURSE PRACTITIONER

## 2024-05-07 PROCEDURE — 82607 VITAMIN B-12: CPT | Performed by: NURSE PRACTITIONER

## 2024-05-07 PROCEDURE — 80050 GENERAL HEALTH PANEL: CPT | Performed by: NURSE PRACTITIONER

## 2024-05-07 PROCEDURE — 82746 ASSAY OF FOLIC ACID SERUM: CPT | Performed by: NURSE PRACTITIONER

## 2024-05-07 PROCEDURE — 80061 LIPID PANEL: CPT | Performed by: NURSE PRACTITIONER

## 2024-05-07 PROCEDURE — 84466 ASSAY OF TRANSFERRIN: CPT | Performed by: NURSE PRACTITIONER

## 2024-05-09 DIAGNOSIS — E61.1 IRON DEFICIENCY: Primary | ICD-10-CM

## 2024-05-09 RX ORDER — FERROUS SULFATE 325(65) MG
325 TABLET ORAL
Qty: 90 TABLET | Refills: 0 | Status: SHIPPED | OUTPATIENT
Start: 2024-05-09

## 2024-11-15 ENCOUNTER — OFFICE VISIT (OUTPATIENT)
Dept: INTERNAL MEDICINE | Facility: CLINIC | Age: 49
End: 2024-11-15
Payer: COMMERCIAL

## 2024-11-15 VITALS
SYSTOLIC BLOOD PRESSURE: 118 MMHG | WEIGHT: 166.6 LBS | OXYGEN SATURATION: 99 % | BODY MASS INDEX: 28.44 KG/M2 | DIASTOLIC BLOOD PRESSURE: 62 MMHG | HEART RATE: 65 BPM | TEMPERATURE: 97.8 F | HEIGHT: 64 IN

## 2024-11-15 DIAGNOSIS — Z13.9 SCREENING FOR CONDITION: ICD-10-CM

## 2024-11-15 DIAGNOSIS — N95.1 MENOPAUSAL SYMPTOMS: ICD-10-CM

## 2024-11-15 DIAGNOSIS — R00.2 PALPITATIONS: ICD-10-CM

## 2024-11-15 DIAGNOSIS — L98.9 SKIN LESION OF RIGHT ARM: ICD-10-CM

## 2024-11-15 DIAGNOSIS — Z12.31 VISIT FOR SCREENING MAMMOGRAM: ICD-10-CM

## 2024-11-15 DIAGNOSIS — E78.5 HYPERLIPIDEMIA, UNSPECIFIED HYPERLIPIDEMIA TYPE: ICD-10-CM

## 2024-11-15 DIAGNOSIS — D50.9 IRON DEFICIENCY ANEMIA, UNSPECIFIED IRON DEFICIENCY ANEMIA TYPE: ICD-10-CM

## 2024-11-15 DIAGNOSIS — Z00.00 ANNUAL PHYSICAL EXAM: Primary | ICD-10-CM

## 2024-11-15 DIAGNOSIS — M25.531 WRIST PAIN, RIGHT: ICD-10-CM

## 2024-11-15 DIAGNOSIS — R07.9 CHEST PAIN, UNSPECIFIED TYPE: ICD-10-CM

## 2024-11-15 LAB
ALBUMIN SERPL-MCNC: 4.2 G/DL (ref 3.5–5.2)
ALBUMIN/GLOB SERPL: 1.6 G/DL
ALP SERPL-CCNC: 82 U/L (ref 39–117)
ALT SERPL W P-5'-P-CCNC: 13 U/L (ref 1–33)
ANION GAP SERPL CALCULATED.3IONS-SCNC: 10 MMOL/L (ref 5–15)
AST SERPL-CCNC: 15 U/L (ref 1–32)
BASOPHILS # BLD AUTO: 0.03 10*3/MM3 (ref 0–0.2)
BASOPHILS NFR BLD AUTO: 0.8 % (ref 0–1.5)
BILIRUB SERPL-MCNC: 0.3 MG/DL (ref 0–1.2)
BUN SERPL-MCNC: 13 MG/DL (ref 6–20)
BUN/CREAT SERPL: 24.1 (ref 7–25)
CALCIUM SPEC-SCNC: 9.4 MG/DL (ref 8.6–10.5)
CHLORIDE SERPL-SCNC: 104 MMOL/L (ref 98–107)
CHOLEST SERPL-MCNC: 188 MG/DL (ref 0–200)
CO2 SERPL-SCNC: 25 MMOL/L (ref 22–29)
CREAT SERPL-MCNC: 0.54 MG/DL (ref 0.57–1)
DEPRECATED RDW RBC AUTO: 35.6 FL (ref 37–54)
EGFRCR SERPLBLD CKD-EPI 2021: 111.6 ML/MIN/1.73
EOSINOPHIL # BLD AUTO: 0.21 10*3/MM3 (ref 0–0.4)
EOSINOPHIL NFR BLD AUTO: 5.9 % (ref 0.3–6.2)
ERYTHROCYTE [DISTWIDTH] IN BLOOD BY AUTOMATED COUNT: 12.7 % (ref 12.3–15.4)
FERRITIN SERPL-MCNC: 20.8 NG/ML (ref 13–150)
GLOBULIN UR ELPH-MCNC: 2.7 GM/DL
GLUCOSE SERPL-MCNC: 78 MG/DL (ref 65–99)
HCT VFR BLD AUTO: 35.3 % (ref 34–46.6)
HCV AB SER QL: NORMAL
HDLC SERPL-MCNC: 57 MG/DL (ref 40–60)
HGB BLD-MCNC: 11.5 G/DL (ref 12–15.9)
IMM GRANULOCYTES # BLD AUTO: 0.01 10*3/MM3 (ref 0–0.05)
IMM GRANULOCYTES NFR BLD AUTO: 0.3 % (ref 0–0.5)
IRON 24H UR-MRATE: 90 MCG/DL (ref 37–145)
IRON SATN MFR SERPL: 19 % (ref 20–50)
LDLC SERPL CALC-MCNC: 118 MG/DL (ref 0–100)
LDLC/HDLC SERPL: 2.05 {RATIO}
LYMPHOCYTES # BLD AUTO: 1.43 10*3/MM3 (ref 0.7–3.1)
LYMPHOCYTES NFR BLD AUTO: 40.2 % (ref 19.6–45.3)
MCH RBC QN AUTO: 25.3 PG (ref 26.6–33)
MCHC RBC AUTO-ENTMCNC: 32.6 G/DL (ref 31.5–35.7)
MCV RBC AUTO: 77.8 FL (ref 79–97)
MONOCYTES # BLD AUTO: 0.4 10*3/MM3 (ref 0.1–0.9)
MONOCYTES NFR BLD AUTO: 11.2 % (ref 5–12)
NEUTROPHILS NFR BLD AUTO: 1.48 10*3/MM3 (ref 1.7–7)
NEUTROPHILS NFR BLD AUTO: 41.6 % (ref 42.7–76)
NRBC BLD AUTO-RTO: 0 /100 WBC (ref 0–0.2)
PLATELET # BLD AUTO: 247 10*3/MM3 (ref 140–450)
PMV BLD AUTO: 10.9 FL (ref 6–12)
POTASSIUM SERPL-SCNC: 4.1 MMOL/L (ref 3.5–5.2)
PROT SERPL-MCNC: 6.9 G/DL (ref 6–8.5)
RBC # BLD AUTO: 4.54 10*6/MM3 (ref 3.77–5.28)
SODIUM SERPL-SCNC: 139 MMOL/L (ref 136–145)
T4 FREE SERPL-MCNC: 1.03 NG/DL (ref 0.92–1.68)
TIBC SERPL-MCNC: 474 MCG/DL (ref 298–536)
TRANSFERRIN SERPL-MCNC: 318 MG/DL (ref 200–360)
TRIGL SERPL-MCNC: 72 MG/DL (ref 0–150)
TSH SERPL DL<=0.05 MIU/L-ACNC: 0.53 UIU/ML (ref 0.27–4.2)
VLDLC SERPL-MCNC: 13 MG/DL (ref 5–40)
WBC NRBC COR # BLD AUTO: 3.56 10*3/MM3 (ref 3.4–10.8)

## 2024-11-15 PROCEDURE — 84466 ASSAY OF TRANSFERRIN: CPT | Performed by: NURSE PRACTITIONER

## 2024-11-15 PROCEDURE — 82746 ASSAY OF FOLIC ACID SERUM: CPT | Performed by: NURSE PRACTITIONER

## 2024-11-15 PROCEDURE — 84439 ASSAY OF FREE THYROXINE: CPT | Performed by: NURSE PRACTITIONER

## 2024-11-15 PROCEDURE — 83540 ASSAY OF IRON: CPT | Performed by: NURSE PRACTITIONER

## 2024-11-15 PROCEDURE — 82607 VITAMIN B-12: CPT | Performed by: NURSE PRACTITIONER

## 2024-11-15 PROCEDURE — 80061 LIPID PANEL: CPT | Performed by: NURSE PRACTITIONER

## 2024-11-15 PROCEDURE — 86803 HEPATITIS C AB TEST: CPT | Performed by: NURSE PRACTITIONER

## 2024-11-15 PROCEDURE — 80050 GENERAL HEALTH PANEL: CPT | Performed by: NURSE PRACTITIONER

## 2024-11-15 PROCEDURE — 82728 ASSAY OF FERRITIN: CPT | Performed by: NURSE PRACTITIONER

## 2024-11-15 RX ORDER — VENLAFAXINE HYDROCHLORIDE 37.5 MG/1
37.5 CAPSULE, EXTENDED RELEASE ORAL DAILY
Qty: 30 CAPSULE | Refills: 1 | Status: SHIPPED | OUTPATIENT
Start: 2024-11-15

## 2024-11-15 NOTE — PROGRESS NOTES
Chief Complaint  Annual Exam (Physical ), Menopause, and Wrist Pain (Wrist pain- shoots up into elbow. )    Subjective      Rosanna Mandujano is a 49 y.o. female who presents to [facility]     Last labs: 5/2024  PAP: 2023  Mammogram: 2018; Denies family history of breast cancer  Hepatitis C screening: Completed   Colonoscopy: 2023; Denies family history of colon cancer  Influenza vaccination: Declines  COVID19 vaccination: Declines   Shingles vaccination: Declines  Eye exam: Up to date   Dental exam: Up to date   Smoking history: Vapes     Annual physical exam-  Patient in clinic for annual physical exam. Reports family history of heart attack in mother in 50s.     Patient reports her last period was over a year and a half ago. Is having significant hot flashes, mood swings, weight gain. States this is impacting her daily life. She continues Lexapro, does feel like it keeps her somewhat stable. She is no longer sleeping well. Will wake up sweating and then cold because she is sweating. Patient reports chest pain, cannot pinpoint if this is related to her mood. Chest pain is 1-2 times a week, will also have palpitations 1-2 times a week. Sometimes these will occur together. EKG normal 2023. Holter monitor with PACs/PVCs, otherwise normal. She would be agreeable to stress test. She does have some interest in GLP1s.     Wrist pain-  Patient reports right wrist pain x 2 months. Started as a weakness in her hand and has progressed to pain radiating into the forearm. States she has a lump on the wrist that can increase in size throughout the day.     Skin lesion-  Right shoulder. Started as a red spot and blistered over a year ago. Blister resolved, continues to have itching and redness.     Objective   Vital Signs:   Vitals:    11/15/24 1153   BP: 118/62   BP Location: Right arm   Patient Position: Sitting   Cuff Size: Adult   Pulse: 65   Temp: 97.8 °F (36.6 °C)  "  TempSrc: Temporal   SpO2: 99%   Weight: 75.6 kg (166 lb 9.6 oz)   Height: 162.6 cm (64.02\")     Body mass index is 28.58 kg/m².    Wt Readings from Last 3 Encounters:   11/15/24 75.6 kg (166 lb 9.6 oz)   05/07/24 76.8 kg (169 lb 6.4 oz)   11/27/23 69.9 kg (154 lb)     BP Readings from Last 3 Encounters:   11/15/24 118/62   05/07/24 92/60   02/07/24 121/84       Health Maintenance   Topic Date Due    TDAP/TD VACCINES (1 - Tdap) Never done    MAMMOGRAM  10/23/2020    HEPATITIS C SCREENING  Never done    ANNUAL PHYSICAL  Never done    ZOSTER VACCINE (1 of 2) Never done    LUNG CANCER SCREENING  Never done    INFLUENZA VACCINE  Never done    COVID-19 Vaccine (3 - 2024-25 season) 09/01/2024    BMI FOLLOWUP  11/27/2024    LIPID PANEL  05/07/2025    PAP SMEAR  04/25/2026    COLORECTAL CANCER SCREENING  05/10/2026    Pneumococcal Vaccine 0-64  Aged Out       Physical Exam  Constitutional:       Appearance: Normal appearance.   HENT:      Head: Normocephalic and atraumatic.      Nose: Nose normal.      Mouth/Throat:      Mouth: Mucous membranes are moist.      Pharynx: Oropharynx is clear.   Eyes:      Extraocular Movements: Extraocular movements intact.      Conjunctiva/sclera: Conjunctivae normal.      Pupils: Pupils are equal, round, and reactive to light.   Neck:      Thyroid: No thyroid mass, thyromegaly or thyroid tenderness.   Cardiovascular:      Rate and Rhythm: Normal rate and regular rhythm.      Heart sounds: Normal heart sounds.   Pulmonary:      Effort: Pulmonary effort is normal.      Breath sounds: Normal breath sounds.   Skin:     General: Skin is warm and dry.          Neurological:      General: No focal deficit present.      Mental Status: She is alert and oriented to person, place, and time.   Psychiatric:         Mood and Affect: Mood normal.         Behavior: Behavior normal.         Thought Content: Thought content normal.          Result Review :  The following data was reviewed by: Lilly Chen, " APRN on 11/15/2024:         Procedures          Assessment & Plan  Annual physical exam  Basic labs in clinic today. Encouraged routine dental and eye exams. Discussed age appropriate immunizations, screenings. Age appropriate handout provided, including information on nutrition and physical activity.  Orders:    Comprehensive Metabolic Panel    CBC & Differential    TSH    Menopausal symptoms  Discussed options for management, including Effexor, clonidine patches, GYN for HRT. Patient would like to trial Effexor before considering HRT. May also consider Veozah at follow up if patient fails treatment with Effexor. Discussed abrupt switch versus weaning from Prozac, she would prefer abrupt switch. She can take 1/2 tablet of Prozac for 3-5 days while initiating Effexor. Discussed with patient that it may take 4-6 weeks for an SSRI/SNRI to reach maximal efficacy and that things will potentially get worse before they get better. Patient aware of black box warning of increased risk of suicidal ideations with these medications and potential side effects of sexual dysfunction and weight gain. Will follow up in four weeks to assess medication effectiveness, sooner if concerns arise. Encouraged patient to seek medical attention immediately if mental health is deteriorating. Denies SI/HI. Referral to GYN in the event that patient prefers to pursue HRT in the future, discussed risks versus benefits.    Orders:    TSH    T4, Free    Ambulatory Referral to Obstetrics / Gynecology    Iron deficiency anemia, unspecified iron deficiency anemia type  Iron studies today.   Orders:    Ferritin    Iron Profile    Hyperlipidemia, unspecified hyperlipidemia type   Most recent , repeat with labs today.   Orders:    Lipid Panel    Chest pain, unspecified type  Normal EKG and holter for symptoms in the past, will schedule for stress test for further evaluation. Strong family history in mother with MI in her 50s. She should seek  medical attention immediatley with severe/persistent chest pain, shortness of breath, palpitations. Anxiety/irritability likely contributing.   Orders:    Treadmill Stress Test; Future    Palpitations    Orders:    Treadmill Stress Test; Future    Wrist pain, right  Likely ganglion cyst. Xray today. Brace provided. Will determine further plan of care based on results of imaging.   Orders:    XR Wrist 3+ View Right (In Office)    Skin lesion of right arm  Referral to dermatology for further evaluation.   Orders:    Ambulatory Referral to Dermatology    Visit for screening mammogram  Mammogram ordered.  Orders:    Mammo Screening Digital Tomosynthesis Bilateral With CAD; Future    Screening for condition  Routine hepatitis C screening today.   Orders:    Hepatitis C Antibody        FOLLOW UP  Return in about 1 month (around 12/15/2024).  Patient was given instructions and counseling regarding her condition or for health maintenance advice. Please see specific information pulled into the AVS if appropriate.       Lilly Chen, YAZAN  11/15/24  13:02 EST    CURRENT & DISCONTINUED MEDICATIONS  Current Outpatient Medications   Medication Instructions    venlafaxine XR (EFFEXOR XR) 37.5 mg, Oral, Daily       Medications Discontinued During This Encounter   Medication Reason    ferrous sulfate 325 (65 FE) MG tablet     escitalopram (LEXAPRO) 20 MG tablet

## 2024-11-18 DIAGNOSIS — D64.9 ANEMIA, UNSPECIFIED TYPE: Primary | ICD-10-CM

## 2024-11-18 LAB
FOLATE SERPL-MCNC: 8.79 NG/ML (ref 4.78–24.2)
VIT B12 BLD-MCNC: 242 PG/ML (ref 211–946)

## 2024-12-16 ENCOUNTER — OFFICE VISIT (OUTPATIENT)
Dept: INTERNAL MEDICINE | Facility: CLINIC | Age: 49
End: 2024-12-16
Payer: COMMERCIAL

## 2024-12-16 VITALS
OXYGEN SATURATION: 100 % | HEART RATE: 52 BPM | SYSTOLIC BLOOD PRESSURE: 104 MMHG | DIASTOLIC BLOOD PRESSURE: 72 MMHG | WEIGHT: 162.4 LBS | TEMPERATURE: 97.2 F | BODY MASS INDEX: 27.72 KG/M2 | RESPIRATION RATE: 16 BRPM | HEIGHT: 64 IN

## 2024-12-16 DIAGNOSIS — E78.5 HYPERLIPIDEMIA, UNSPECIFIED HYPERLIPIDEMIA TYPE: ICD-10-CM

## 2024-12-16 DIAGNOSIS — N95.1 MENOPAUSAL SYMPTOMS: Primary | ICD-10-CM

## 2024-12-16 PROCEDURE — 99213 OFFICE O/P EST LOW 20 MIN: CPT | Performed by: NURSE PRACTITIONER

## 2024-12-16 RX ORDER — VENLAFAXINE HYDROCHLORIDE 37.5 MG/1
37.5 CAPSULE, EXTENDED RELEASE ORAL DAILY
Qty: 90 CAPSULE | Refills: 1 | Status: SHIPPED | OUTPATIENT
Start: 2024-12-16

## 2024-12-16 NOTE — PROGRESS NOTES
"Chief Complaint  Hyperlipidemia    Subjective      Rosanna Mandujano is a 49 y.o. female who presents to [facility]     Patient in clinic for one month follow up since discontinuing Prozac and starting Effexor for menopausal symptoms. Reports initially her mood was a little off while she was in the middle of adjusting, however since that time she has felt great. Reports her mood has been stable, she is sleeping well and the hot flashes have decreased significantly. She is happy with the medication and reports no side effects. Denies SI/HI. She would like to leave dosage as is for now.    HLD-  Most recent . She would like to monitor closely.     Objective   Vital Signs:   Vitals:    12/16/24 1343   BP: 104/72   BP Location: Left arm   Patient Position: Sitting   Cuff Size: Adult   Pulse: 52   Resp: 16   Temp: 97.2 °F (36.2 °C)   TempSrc: Temporal   SpO2: 100%   Weight: 73.7 kg (162 lb 6.4 oz)   Height: 162.6 cm (64\")     Body mass index is 27.88 kg/m².    Wt Readings from Last 3 Encounters:   12/16/24 73.7 kg (162 lb 6.4 oz)   11/15/24 75.6 kg (166 lb 9.6 oz)   05/07/24 76.8 kg (169 lb 6.4 oz)     BP Readings from Last 3 Encounters:   12/16/24 104/72   11/15/24 118/62   05/07/24 92/60       Health Maintenance   Topic Date Due    TDAP/TD VACCINES (1 - Tdap) Never done    MAMMOGRAM  10/23/2020    ZOSTER VACCINE (1 of 2) Never done    LUNG CANCER SCREENING  Never done    BMI FOLLOWUP  11/27/2024    COVID-19 Vaccine (3 - 2024-25 season) 12/18/2024 (Originally 9/1/2024)    INFLUENZA VACCINE  03/31/2025 (Originally 7/1/2024)    ANNUAL PHYSICAL  11/15/2025    LIPID PANEL  11/15/2025    PAP SMEAR  04/25/2026    COLORECTAL CANCER SCREENING  05/10/2026    HEPATITIS C SCREENING  Completed    Pneumococcal Vaccine 0-64  Aged Out       Physical Exam  Constitutional:       Appearance: Normal appearance.   HENT:      Head: Normocephalic and atraumatic.      Nose: Nose " normal.      Mouth/Throat:      Mouth: Mucous membranes are moist.      Pharynx: Oropharynx is clear.   Eyes:      Extraocular Movements: Extraocular movements intact.      Conjunctiva/sclera: Conjunctivae normal.      Pupils: Pupils are equal, round, and reactive to light.   Cardiovascular:      Rate and Rhythm: Normal rate and regular rhythm.      Heart sounds: Normal heart sounds.   Pulmonary:      Effort: Pulmonary effort is normal.      Breath sounds: Normal breath sounds.   Skin:     General: Skin is warm and dry.   Neurological:      General: No focal deficit present.      Mental Status: She is alert and oriented to person, place, and time.   Psychiatric:         Mood and Affect: Mood normal.         Behavior: Behavior normal.         Thought Content: Thought content normal.          Result Review :  The following data was reviewed by: YAZAN Taylor on 12/16/2024:         Procedures          Assessment & Plan  Menopausal symptoms  Patient with significant improvement, will continue Effexor at current dosage. She will continue to monitor and seek medical attention immediately if she feels that her mental health is deteriorating. Denies SI/HI. Follow up in six months, sooner if concerns arise.         Hyperlipidemia, unspecified hyperlipidemia type  She will continue to work on lifestyle modifications to improve naturally. Repeat lipid panel in six months.              FOLLOW UP  Return in about 6 months (around 6/16/2025).  Patient was given instructions and counseling regarding her condition or for health maintenance advice. Please see specific information pulled into the AVS if appropriate.       YAZAN Taylor  12/16/24  14:01 EST    CURRENT & DISCONTINUED MEDICATIONS  Current Outpatient Medications   Medication Instructions    venlafaxine XR (EFFEXOR XR) 37.5 mg, Oral, Daily       Medications Discontinued During This Encounter   Medication Reason    venlafaxine XR (Effexor XR) 37.5 MG 24 hr  capsule Reorder

## 2025-01-08 ENCOUNTER — HOSPITAL ENCOUNTER (OUTPATIENT)
Dept: CARDIOLOGY | Facility: HOSPITAL | Age: 50
Discharge: HOME | End: 2025-01-08
Payer: COMMERCIAL

## 2025-01-08 ENCOUNTER — HOSPITAL ENCOUNTER (OUTPATIENT)
Dept: MAMMOGRAPHY | Facility: HOSPITAL | Age: 50
Discharge: HOME | End: 2025-01-08
Payer: COMMERCIAL

## 2025-01-08 VITALS — WEIGHT: 162 LBS | HEIGHT: 64 IN | BODY MASS INDEX: 27.66 KG/M2

## 2025-01-08 DIAGNOSIS — Z12.31 VISIT FOR SCREENING MAMMOGRAM: ICD-10-CM

## 2025-01-08 DIAGNOSIS — R00.2 PALPITATIONS: ICD-10-CM

## 2025-01-08 DIAGNOSIS — R07.9 CHEST PAIN, UNSPECIFIED TYPE: ICD-10-CM

## 2025-01-08 LAB
BH CV IMMEDIATE POST RECOVERY TECH DATA SYMPTOMS: NORMAL
BH CV IMMEDIATE POST TECH DATA BLOOD PRESSURE: NORMAL MMHG
BH CV IMMEDIATE POST TECH DATA HEART RATE: 119 BPM
BH CV IMMEDIATE POST TECH DATA OXYGEN SATS: 98 %
BH CV SIX MINUTE RECOVERY TECH DATA BLOOD PRESSURE: NORMAL
BH CV SIX MINUTE RECOVERY TECH DATA HEART RATE: 82 BPM
BH CV SIX MINUTE RECOVERY TECH DATA OXYGEN SATURATION: 98 %
BH CV SIX MINUTE RECOVERY TECH DATA SYMPTOMS: NORMAL
BH CV STRESS BP STAGE 1: NORMAL
BH CV STRESS BP STAGE 2: NORMAL
BH CV STRESS BP STAGE 3: NORMAL
BH CV STRESS DURATION MIN STAGE 1: 3
BH CV STRESS DURATION MIN STAGE 2: 3
BH CV STRESS DURATION MIN STAGE 3: 1
BH CV STRESS DURATION SEC STAGE 1: 0
BH CV STRESS DURATION SEC STAGE 2: 0
BH CV STRESS DURATION SEC STAGE 3: 30
BH CV STRESS GRADE STAGE 1: 10
BH CV STRESS GRADE STAGE 2: 12
BH CV STRESS GRADE STAGE 3: 14
BH CV STRESS HR STAGE 1: 114
BH CV STRESS HR STAGE 2: 133
BH CV STRESS HR STAGE 3: 154
BH CV STRESS METS STAGE 1: 5
BH CV STRESS METS STAGE 2: 7.5
BH CV STRESS METS STAGE 3: 10
BH CV STRESS O2 STAGE 1: 99
BH CV STRESS O2 STAGE 2: 98
BH CV STRESS O2 STAGE 3: 98
BH CV STRESS PROTOCOL 1: NORMAL
BH CV STRESS RECOVERY BP: NORMAL MMHG
BH CV STRESS RECOVERY HR: 82 BPM
BH CV STRESS RECOVERY O2: 98 %
BH CV STRESS SPEED STAGE 1: 1.7
BH CV STRESS SPEED STAGE 2: 2.5
BH CV STRESS SPEED STAGE 3: 3.4
BH CV STRESS STAGE 1: 1
BH CV STRESS STAGE 2: 2
BH CV STRESS STAGE 3: 3
BH CV THREE MINUTE POST TECH DATA BLOOD PRESSURE: NORMAL MMHG
BH CV THREE MINUTE POST TECH DATA HEART RATE: 93 BPM
BH CV THREE MINUTE POST TECH DATA OXYGEN SATURATION: 98 %
BH CV THREE MINUTE RECOVERY TECH DATA SYMPTOM: NORMAL
MAXIMAL PREDICTED HEART RATE: 169 BPM
PERCENT MAX PREDICTED HR: 91.12 %
STRESS BASELINE BP: NORMAL MMHG
STRESS BASELINE HR: 75 BPM
STRESS O2 SAT REST: 98 %
STRESS PERCENT HR: 107 %
STRESS POST ESTIMATED WORKLOAD: 9.5 METS
STRESS POST EXERCISE DUR MIN: 7 MIN
STRESS POST EXERCISE DUR SEC: 30 SEC
STRESS POST O2 SAT PEAK: 98 %
STRESS POST PEAK BP: NORMAL MMHG
STRESS POST PEAK HR: 154 BPM
STRESS TARGET HR: 144 BPM

## 2025-01-08 PROCEDURE — 93017 CV STRESS TEST TRACING ONLY: CPT

## 2025-01-08 PROCEDURE — 77063 BREAST TOMOSYNTHESIS BI: CPT

## 2025-01-08 PROCEDURE — 77067 SCR MAMMO BI INCL CAD: CPT

## 2025-04-03 ENCOUNTER — OFFICE VISIT (OUTPATIENT)
Dept: INTERNAL MEDICINE | Facility: CLINIC | Age: 50
End: 2025-04-03
Payer: COMMERCIAL

## 2025-04-03 VITALS
DIASTOLIC BLOOD PRESSURE: 66 MMHG | WEIGHT: 165.13 LBS | HEART RATE: 88 BPM | SYSTOLIC BLOOD PRESSURE: 98 MMHG | OXYGEN SATURATION: 98 % | RESPIRATION RATE: 20 BRPM | BODY MASS INDEX: 28.19 KG/M2 | TEMPERATURE: 98 F | HEIGHT: 64 IN

## 2025-04-03 DIAGNOSIS — F33.0 MAJOR DEPRESSIVE DISORDER, RECURRENT, MILD: Primary | ICD-10-CM

## 2025-04-03 PROCEDURE — 99213 OFFICE O/P EST LOW 20 MIN: CPT | Performed by: INTERNAL MEDICINE

## 2025-04-03 RX ORDER — VENLAFAXINE HYDROCHLORIDE 75 MG/1
75 CAPSULE, EXTENDED RELEASE ORAL DAILY
Qty: 30 CAPSULE | Refills: 1 | Status: SHIPPED | OUTPATIENT
Start: 2025-04-03

## 2025-05-07 NOTE — PROGRESS NOTES
"Chief Complaint  Depression (Medication review for depression )    Subjective      Rosanna Mandujano is a 50 y.o. female who presents to [facility]     Presenting to discuss medication for depression symptoms. She was started on Effexor for menopausal symptoms. Wondering if dose can be increased.     Objective   Vital Signs:   Vitals:    04/03/25 1420   BP: 98/66   BP Location: Left arm   Patient Position: Sitting   Cuff Size: Adult   Pulse: 88   Resp: 20   Temp: 98 °F (36.7 °C)   TempSrc: Temporal   SpO2: 98%   Weight: 74.9 kg (165 lb 2 oz)   Height: 162.6 cm (64\")     Body mass index is 28.34 kg/m².    Wt Readings from Last 3 Encounters:   04/03/25 74.9 kg (165 lb 2 oz)   01/08/25 73.5 kg (162 lb)   12/16/24 73.7 kg (162 lb 6.4 oz)     BP Readings from Last 3 Encounters:   04/03/25 98/66   12/16/24 104/72   11/15/24 118/62       Health Maintenance   Topic Date Due    TDAP/TD VACCINES (1 - Tdap) Never done    Pneumococcal Vaccine 50+ (1 of 1 - PCV) Never done    ZOSTER VACCINE (1 of 2) Never done    LUNG CANCER SCREENING  Never done    COVID-19 Vaccine (3 - 2024-25 season) 09/01/2024    INFLUENZA VACCINE  07/01/2025    ANNUAL PHYSICAL  11/15/2025    LIPID PANEL  11/15/2025    PAP SMEAR  04/25/2026    COLORECTAL CANCER SCREENING  05/10/2026    MAMMOGRAM  01/08/2027    HEPATITIS C SCREENING  Completed       Physical Exam  Vitals reviewed.   Constitutional:       Appearance: Normal appearance. She is well-developed.   HENT:      Head: Normocephalic and atraumatic.      Mouth/Throat:      Pharynx: No oropharyngeal exudate.   Eyes:      Conjunctiva/sclera: Conjunctivae normal.      Pupils: Pupils are equal, round, and reactive to light.   Neck:      Thyroid: No thyromegaly or thyroid tenderness.   Cardiovascular:      Rate and Rhythm: Normal rate and regular rhythm.      Heart sounds: No murmur heard.     No friction rub. No gallop.   Pulmonary:      Effort: Pulmonary " effort is normal.      Breath sounds: Normal breath sounds. No wheezing or rhonchi.   Lymphadenopathy:      Cervical: No cervical adenopathy.   Skin:     General: Skin is warm and dry.   Neurological:      Mental Status: She is alert and oriented to person, place, and time.   Psychiatric:         Mood and Affect: Affect normal.          Result Review :  The following data was reviewed by: Radha Nicole MD on 04/03/2025:         Procedures          Assessment & Plan  Major depressive disorder, recurrent, mild  Will increase Effexor to 75mg daily. Follow up in 6 weeks to assess effectiveness.                        FOLLOW UP  Return in about 6 weeks (around 5/15/2025) for Next scheduled follow up.  Patient was given instructions and counseling regarding her condition or for health maintenance advice. Please see specific information pulled into the AVS if appropriate.       Radha Nicole MD  05/07/25  16:42 EDT    CURRENT & DISCONTINUED MEDICATIONS  Current Outpatient Medications   Medication Instructions    venlafaxine XR (EFFEXOR XR) 75 mg, Oral, Daily       Medications Discontinued During This Encounter   Medication Reason    venlafaxine XR (Effexor XR) 37.5 MG 24 hr capsule Reorder

## 2025-06-06 ENCOUNTER — OFFICE VISIT (OUTPATIENT)
Dept: INTERNAL MEDICINE | Facility: CLINIC | Age: 50
End: 2025-06-06
Payer: COMMERCIAL

## 2025-06-06 VITALS
TEMPERATURE: 97.5 F | HEART RATE: 65 BPM | WEIGHT: 166.25 LBS | HEIGHT: 64 IN | RESPIRATION RATE: 16 BRPM | DIASTOLIC BLOOD PRESSURE: 68 MMHG | SYSTOLIC BLOOD PRESSURE: 104 MMHG | OXYGEN SATURATION: 98 % | BODY MASS INDEX: 28.38 KG/M2

## 2025-06-06 DIAGNOSIS — E78.5 HYPERLIPIDEMIA, UNSPECIFIED HYPERLIPIDEMIA TYPE: ICD-10-CM

## 2025-06-06 DIAGNOSIS — R53.83 FATIGUE, UNSPECIFIED TYPE: ICD-10-CM

## 2025-06-06 DIAGNOSIS — R00.2 PALPITATIONS: ICD-10-CM

## 2025-06-06 DIAGNOSIS — F33.0 MAJOR DEPRESSIVE DISORDER, RECURRENT, MILD: Primary | ICD-10-CM

## 2025-06-06 DIAGNOSIS — N95.1 MENOPAUSAL SYMPTOMS: ICD-10-CM

## 2025-06-06 DIAGNOSIS — D50.9 IRON DEFICIENCY ANEMIA, UNSPECIFIED IRON DEFICIENCY ANEMIA TYPE: ICD-10-CM

## 2025-06-06 LAB
ALBUMIN SERPL-MCNC: 4.1 G/DL (ref 3.5–5.2)
ALBUMIN/GLOB SERPL: 1.4 G/DL
ALP SERPL-CCNC: 87 U/L (ref 39–117)
ALT SERPL W P-5'-P-CCNC: 16 U/L (ref 1–33)
ANION GAP SERPL CALCULATED.3IONS-SCNC: 10.1 MMOL/L (ref 5–15)
AST SERPL-CCNC: 21 U/L (ref 1–32)
BASOPHILS # BLD AUTO: 0.04 10*3/MM3 (ref 0–0.2)
BASOPHILS NFR BLD AUTO: 1 % (ref 0–1.5)
BILIRUB SERPL-MCNC: <0.2 MG/DL (ref 0–1.2)
BUN SERPL-MCNC: 14 MG/DL (ref 6–20)
BUN/CREAT SERPL: 24.6 (ref 7–25)
CALCIUM SPEC-SCNC: 9.3 MG/DL (ref 8.6–10.5)
CHLORIDE SERPL-SCNC: 104 MMOL/L (ref 98–107)
CHOLEST SERPL-MCNC: 176 MG/DL (ref 0–200)
CO2 SERPL-SCNC: 24.9 MMOL/L (ref 22–29)
CREAT SERPL-MCNC: 0.57 MG/DL (ref 0.57–1)
DEPRECATED RDW RBC AUTO: 35.3 FL (ref 37–54)
EGFRCR SERPLBLD CKD-EPI 2021: 109.5 ML/MIN/1.73
EOSINOPHIL # BLD AUTO: 0.25 10*3/MM3 (ref 0–0.4)
EOSINOPHIL NFR BLD AUTO: 6.1 % (ref 0.3–6.2)
ERYTHROCYTE [DISTWIDTH] IN BLOOD BY AUTOMATED COUNT: 12.5 % (ref 12.3–15.4)
FERRITIN SERPL-MCNC: 25.8 NG/ML (ref 13–150)
FOLATE SERPL-MCNC: 14.5 NG/ML (ref 4.78–24.2)
GLOBULIN UR ELPH-MCNC: 2.9 GM/DL
GLUCOSE SERPL-MCNC: 80 MG/DL (ref 65–99)
HCT VFR BLD AUTO: 33.8 % (ref 34–46.6)
HDLC SERPL-MCNC: 59 MG/DL (ref 40–60)
HGB BLD-MCNC: 10.9 G/DL (ref 12–15.9)
IMM GRANULOCYTES # BLD AUTO: 0 10*3/MM3 (ref 0–0.05)
IMM GRANULOCYTES NFR BLD AUTO: 0 % (ref 0–0.5)
IRON 24H UR-MRATE: 59 MCG/DL (ref 37–145)
IRON SATN MFR SERPL: 13 % (ref 20–50)
LDLC SERPL CALC-MCNC: 99 MG/DL (ref 0–100)
LDLC/HDLC SERPL: 1.65 {RATIO}
LYMPHOCYTES # BLD AUTO: 1.58 10*3/MM3 (ref 0.7–3.1)
LYMPHOCYTES NFR BLD AUTO: 38.4 % (ref 19.6–45.3)
MCH RBC QN AUTO: 25.6 PG (ref 26.6–33)
MCHC RBC AUTO-ENTMCNC: 32.2 G/DL (ref 31.5–35.7)
MCV RBC AUTO: 79.5 FL (ref 79–97)
MONOCYTES # BLD AUTO: 0.47 10*3/MM3 (ref 0.1–0.9)
MONOCYTES NFR BLD AUTO: 11.4 % (ref 5–12)
NEUTROPHILS NFR BLD AUTO: 1.77 10*3/MM3 (ref 1.7–7)
NEUTROPHILS NFR BLD AUTO: 43.1 % (ref 42.7–76)
NRBC BLD AUTO-RTO: 0 /100 WBC (ref 0–0.2)
PLATELET # BLD AUTO: 222 10*3/MM3 (ref 140–450)
PMV BLD AUTO: 10.9 FL (ref 6–12)
POTASSIUM SERPL-SCNC: 4.1 MMOL/L (ref 3.5–5.2)
PROT SERPL-MCNC: 7 G/DL (ref 6–8.5)
RBC # BLD AUTO: 4.25 10*6/MM3 (ref 3.77–5.28)
SODIUM SERPL-SCNC: 139 MMOL/L (ref 136–145)
T4 FREE SERPL-MCNC: 1.03 NG/DL (ref 0.92–1.68)
TIBC SERPL-MCNC: 465 MCG/DL (ref 298–536)
TRANSFERRIN SERPL-MCNC: 312 MG/DL (ref 200–360)
TRIGL SERPL-MCNC: 97 MG/DL (ref 0–150)
TSH SERPL DL<=0.05 MIU/L-ACNC: 0.26 UIU/ML (ref 0.27–4.2)
VIT B12 BLD-MCNC: 278 PG/ML (ref 211–946)
VLDLC SERPL-MCNC: 18 MG/DL (ref 5–40)
WBC NRBC COR # BLD AUTO: 4.11 10*3/MM3 (ref 3.4–10.8)

## 2025-06-06 PROCEDURE — 83540 ASSAY OF IRON: CPT | Performed by: NURSE PRACTITIONER

## 2025-06-06 PROCEDURE — 80061 LIPID PANEL: CPT | Performed by: NURSE PRACTITIONER

## 2025-06-06 PROCEDURE — 82728 ASSAY OF FERRITIN: CPT | Performed by: NURSE PRACTITIONER

## 2025-06-06 PROCEDURE — 82607 VITAMIN B-12: CPT | Performed by: NURSE PRACTITIONER

## 2025-06-06 PROCEDURE — 84466 ASSAY OF TRANSFERRIN: CPT | Performed by: NURSE PRACTITIONER

## 2025-06-06 PROCEDURE — 84439 ASSAY OF FREE THYROXINE: CPT | Performed by: NURSE PRACTITIONER

## 2025-06-06 PROCEDURE — 80050 GENERAL HEALTH PANEL: CPT | Performed by: NURSE PRACTITIONER

## 2025-06-06 PROCEDURE — 82746 ASSAY OF FOLIC ACID SERUM: CPT | Performed by: NURSE PRACTITIONER

## 2025-06-06 RX ORDER — VENLAFAXINE HYDROCHLORIDE 37.5 MG/1
1 CAPSULE, EXTENDED RELEASE ORAL DAILY
COMMUNITY
Start: 2025-04-26

## 2025-06-06 NOTE — ASSESSMENT & PLAN NOTE
Cardiac workup without concern, will continue to monitor. Labs as discussed.  Orders:    TSH    T4, Free

## 2025-06-06 NOTE — PROGRESS NOTES
"Chief Complaint  Fatigue (Tired all the time. Has been going on for at least a month, wants  Labs for iron checked )    Subjective      Rosanna Mandujano is a 50 y.o. female who presents to [facility]     Patient with history of iron deficiency and infusion. Did not tolerate oral formulations. States she is starting to feel like she is deficient again, has started noticing shortness of breath, fatigue and intermittent palpitations. Normal cardiac workup earlier this year with rare PAC's and PVC's. States she sleeps more than she should, does not feel rested when she wakes up. Has never had a sleep study. Hot flashes are bearable. Mental health has also been better, did not increase Effexor but was able to work through things on her own. Denies chest pain, abnormal bleeding. Mammogram: 1/2025. Cologuard: 2023.     Objective   Vital Signs:   Vitals:    06/06/25 1533   BP: 104/68   BP Location: Right arm   Patient Position: Sitting   Cuff Size: Adult   Pulse: 65   Resp: 16   Temp: 97.5 °F (36.4 °C)   TempSrc: Temporal   SpO2: 98%   Weight: 75.4 kg (166 lb 4 oz)   Height: 162.6 cm (64\")     Body mass index is 28.54 kg/m².    Wt Readings from Last 3 Encounters:   06/06/25 75.4 kg (166 lb 4 oz)   04/03/25 74.9 kg (165 lb 2 oz)   01/08/25 73.5 kg (162 lb)     BP Readings from Last 3 Encounters:   06/06/25 104/68   04/03/25 98/66   12/16/24 104/72       Health Maintenance   Topic Date Due    TDAP/TD VACCINES (1 - Tdap) Never done    Pneumococcal Vaccine 50+ (1 of 1 - PCV) Never done    ZOSTER VACCINE (1 of 2) Never done    LUNG CANCER SCREENING  Never done    COVID-19 Vaccine (3 - 2024-25 season) 09/01/2024    INFLUENZA VACCINE  07/01/2025    ANNUAL PHYSICAL  11/15/2025    LIPID PANEL  11/15/2025    PAP SMEAR  04/25/2026    COLORECTAL CANCER SCREENING  05/10/2026    MAMMOGRAM  01/08/2027    HEPATITIS C SCREENING  Completed       Physical Exam  Constitutional:       " Appearance: Normal appearance.   HENT:      Head: Normocephalic and atraumatic.      Nose: Nose normal.      Mouth/Throat:      Mouth: Mucous membranes are moist.      Pharynx: Oropharynx is clear.   Eyes:      Extraocular Movements: Extraocular movements intact.      Conjunctiva/sclera: Conjunctivae normal.      Pupils: Pupils are equal, round, and reactive to light.   Cardiovascular:      Rate and Rhythm: Normal rate and regular rhythm.      Heart sounds: Normal heart sounds.   Pulmonary:      Effort: Pulmonary effort is normal.      Breath sounds: Normal breath sounds.   Skin:     General: Skin is warm and dry.   Neurological:      General: No focal deficit present.      Mental Status: She is alert and oriented to person, place, and time.   Psychiatric:         Mood and Affect: Mood normal.         Behavior: Behavior normal.         Thought Content: Thought content normal.          Result Review :  The following data was reviewed by: YAZAN Taylor on 06/06/2025:  Common labs          11/15/2024    13:12   Common Labs   Glucose 78    BUN 13    Creatinine 0.54    Sodium 139    Potassium 4.1    Chloride 104    Calcium 9.4    Albumin 4.2    Total Bilirubin 0.3    Alkaline Phosphatase 82    AST (SGOT) 15    ALT (SGPT) 13    WBC 3.56    Hemoglobin 11.5    Hematocrit 35.3    Platelets 247    Total Cholesterol 188    Triglycerides 72    HDL Cholesterol 57    LDL Cholesterol  118           Procedures          Assessment & Plan  Major depressive disorder, recurrent, mild  Anxiety/depression well controlled, continue Effexor. Patient should continue to monitor and seek medical attention immediately if she feels that her mental health is deteriorating. Denies SI/HI. Will continue to monitor.        Menopausal symptoms  Stable, continue Effexor.       Fatigue, unspecified type  Labs today, will determine further plan of care based on results. Could consider sleep study in the future.  Orders:    Ferritin    Iron Profile  w/o Ferritin    Vitamin B12 & Folate    Iron deficiency anemia, unspecified iron deficiency anemia type  Labs today, will order repeat infusion if appropriate.  Orders:    Comprehensive Metabolic Panel    CBC & Differential    Ferritin    Iron Profile w/o Ferritin    Hyperlipidemia, unspecified hyperlipidemia type  Lipid panel with labs.    Orders:    Comprehensive Metabolic Panel    CBC & Differential    Lipid Panel    Palpitations  Cardiac workup without concern, will continue to monitor. Labs as discussed.  Orders:    TSH    T4, Free         FOLLOW UP  Return in about 3 months (around 9/6/2025).  Patient was given instructions and counseling regarding her condition or for health maintenance advice. Please see specific information pulled into the AVS if appropriate.       Lilly Chen, YAZAN  06/06/25  17:03 EDT    CURRENT & DISCONTINUED MEDICATIONS  Current Outpatient Medications   Medication Instructions    venlafaxine XR (EFFEXOR-XR) 37.5 MG 24 hr capsule 1 capsule, Daily       Medications Discontinued During This Encounter   Medication Reason    venlafaxine XR (Effexor XR) 75 MG 24 hr capsule

## 2025-06-12 DIAGNOSIS — D50.9 IRON DEFICIENCY ANEMIA, UNSPECIFIED IRON DEFICIENCY ANEMIA TYPE: Primary | ICD-10-CM

## 2025-06-12 DIAGNOSIS — R79.89 LOW TSH LEVEL: Primary | ICD-10-CM

## 2025-06-12 DIAGNOSIS — T45.4X5A ADVERSE EFFECT OF IRON, INITIAL ENCOUNTER: ICD-10-CM

## 2025-06-12 RX ORDER — DIPHENHYDRAMINE HYDROCHLORIDE 50 MG/ML
50 INJECTION, SOLUTION INTRAMUSCULAR; INTRAVENOUS AS NEEDED
OUTPATIENT
Start: 2025-06-12

## 2025-06-12 RX ORDER — FAMOTIDINE 10 MG/ML
20 INJECTION, SOLUTION INTRAVENOUS AS NEEDED
OUTPATIENT
Start: 2025-06-12

## 2025-06-12 RX ORDER — HYDROCORTISONE SODIUM SUCCINATE 100 MG/2ML
100 INJECTION INTRAMUSCULAR; INTRAVENOUS AS NEEDED
OUTPATIENT
Start: 2025-06-12

## 2025-06-12 RX ORDER — SODIUM CHLORIDE 9 MG/ML
20 INJECTION, SOLUTION INTRAVENOUS ONCE
OUTPATIENT
Start: 2025-06-12

## 2025-06-23 ENCOUNTER — HOSPITAL ENCOUNTER (OUTPATIENT)
Dept: INFUSION THERAPY | Facility: HOSPITAL | Age: 50
Discharge: HOME | End: 2025-06-23
Admitting: NURSE PRACTITIONER
Payer: COMMERCIAL

## 2025-06-23 VITALS
DIASTOLIC BLOOD PRESSURE: 56 MMHG | RESPIRATION RATE: 18 BRPM | WEIGHT: 164.68 LBS | HEART RATE: 70 BPM | OXYGEN SATURATION: 100 % | HEIGHT: 64 IN | BODY MASS INDEX: 28.12 KG/M2 | SYSTOLIC BLOOD PRESSURE: 90 MMHG | TEMPERATURE: 98 F

## 2025-06-23 DIAGNOSIS — T45.4X5A ADVERSE EFFECT OF IRON, INITIAL ENCOUNTER: Primary | ICD-10-CM

## 2025-06-23 DIAGNOSIS — D50.9 IRON DEFICIENCY ANEMIA, UNSPECIFIED IRON DEFICIENCY ANEMIA TYPE: ICD-10-CM

## 2025-06-23 PROCEDURE — 25010000002 IRON SUCROSE PER 1 MG: Performed by: NURSE PRACTITIONER

## 2025-06-23 PROCEDURE — 96374 THER/PROPH/DIAG INJ IV PUSH: CPT

## 2025-06-23 RX ORDER — DIPHENHYDRAMINE HYDROCHLORIDE 50 MG/ML
50 INJECTION, SOLUTION INTRAMUSCULAR; INTRAVENOUS AS NEEDED
Status: CANCELLED | OUTPATIENT
Start: 2025-06-23

## 2025-06-23 RX ORDER — FAMOTIDINE 10 MG/ML
20 INJECTION, SOLUTION INTRAVENOUS AS NEEDED
Status: DISCONTINUED | OUTPATIENT
Start: 2025-06-23 | End: 2025-06-25 | Stop reason: HOSPADM

## 2025-06-23 RX ORDER — SODIUM CHLORIDE 9 MG/ML
20 INJECTION, SOLUTION INTRAVENOUS ONCE
Status: DISCONTINUED | OUTPATIENT
Start: 2025-06-23 | End: 2025-06-25 | Stop reason: HOSPADM

## 2025-06-23 RX ORDER — DIPHENHYDRAMINE HYDROCHLORIDE 50 MG/ML
50 INJECTION, SOLUTION INTRAMUSCULAR; INTRAVENOUS AS NEEDED
Status: DISCONTINUED | OUTPATIENT
Start: 2025-06-23 | End: 2025-06-25 | Stop reason: HOSPADM

## 2025-06-23 RX ORDER — SODIUM CHLORIDE 9 MG/ML
20 INJECTION, SOLUTION INTRAVENOUS ONCE
Status: CANCELLED | OUTPATIENT
Start: 2025-06-23

## 2025-06-23 RX ORDER — HYDROCORTISONE SODIUM SUCCINATE 100 MG/2ML
100 INJECTION INTRAMUSCULAR; INTRAVENOUS AS NEEDED
Status: CANCELLED | OUTPATIENT
Start: 2025-06-23

## 2025-06-23 RX ORDER — FAMOTIDINE 10 MG/ML
20 INJECTION, SOLUTION INTRAVENOUS AS NEEDED
Status: CANCELLED | OUTPATIENT
Start: 2025-06-23

## 2025-06-23 RX ORDER — HYDROCORTISONE SODIUM SUCCINATE 100 MG/2ML
100 INJECTION INTRAMUSCULAR; INTRAVENOUS AS NEEDED
Status: DISCONTINUED | OUTPATIENT
Start: 2025-06-23 | End: 2025-06-25 | Stop reason: HOSPADM

## 2025-06-23 RX ADMIN — IRON SUCROSE 200 MG: 20 INJECTION, SOLUTION INTRAVENOUS at 14:07

## 2025-07-22 NOTE — TELEPHONE ENCOUNTER
[facility]RI Protocol [id number]/22/2025 02:33 PM   Protocol Details Previous Prescriber is not Historical.

## 2025-07-24 RX ORDER — VENLAFAXINE HYDROCHLORIDE 37.5 MG/1
37.5 CAPSULE, EXTENDED RELEASE ORAL DAILY
Qty: 90 CAPSULE | Refills: 1 | Status: SHIPPED | OUTPATIENT
Start: 2025-07-24

## 2025-07-28 ENCOUNTER — CLINICAL SUPPORT (OUTPATIENT)
Dept: INTERNAL MEDICINE | Facility: CLINIC | Age: 50
End: 2025-07-28
Payer: COMMERCIAL

## 2025-07-28 ENCOUNTER — TELEPHONE (OUTPATIENT)
Dept: INTERNAL MEDICINE | Facility: CLINIC | Age: 50
End: 2025-07-28

## 2025-07-28 DIAGNOSIS — R79.89 LOW TSH LEVEL: ICD-10-CM

## 2025-07-28 DIAGNOSIS — D50.9 IRON DEFICIENCY ANEMIA, UNSPECIFIED IRON DEFICIENCY ANEMIA TYPE: Primary | ICD-10-CM

## 2025-07-28 LAB
IRON 24H UR-MRATE: 84 MCG/DL (ref 37–145)
IRON SATN MFR SERPL: 21 % (ref 20–50)
T4 FREE SERPL-MCNC: 1.06 NG/DL (ref 0.92–1.68)
TIBC SERPL-MCNC: 402 MCG/DL (ref 298–536)
TRANSFERRIN SERPL-MCNC: 270 MG/DL (ref 200–360)
TSH SERPL DL<=0.05 MIU/L-ACNC: 0.21 UIU/ML (ref 0.27–4.2)

## 2025-07-28 PROCEDURE — 83540 ASSAY OF IRON: CPT | Performed by: NURSE PRACTITIONER

## 2025-07-28 PROCEDURE — 36415 COLL VENOUS BLD VENIPUNCTURE: CPT | Performed by: NURSE PRACTITIONER

## 2025-07-28 PROCEDURE — 84466 ASSAY OF TRANSFERRIN: CPT | Performed by: NURSE PRACTITIONER

## 2025-07-28 PROCEDURE — 84439 ASSAY OF FREE THYROXINE: CPT | Performed by: NURSE PRACTITIONER

## 2025-07-28 PROCEDURE — 84443 ASSAY THYROID STIM HORMONE: CPT | Performed by: NURSE PRACTITIONER

## 2025-07-28 NOTE — TELEPHONE ENCOUNTER
"Patient came into office for repeat lab work this afternoon.  While in the office, patient discussed symptoms since she was experiencing with Menopause; specifically having issues with vaginal dryness.  Patient confirmed \"it's like sandpaper when I walk.\"  Told her I would send a message to her provider for possible recommendations or to see if she needs to be seen in office.     Gerda Ponce RN BSN  [facility], [place] office   "

## 2025-07-28 NOTE — PROGRESS NOTES
Venipuncture Blood Specimen Collection  Venipuncture performed in [facility] by Gerda Ponce RN with good hemostasis. Patient tolerated the procedure well without complications.   07/28/25   Gerda Ponce RN

## 2025-07-31 NOTE — TELEPHONE ENCOUNTER
She can start with an over the counter vaginal lubricant, such as RepHresh or Replens. There are many other over the counter if she has a preference. I would start with one of these before considering the hormonal options.